# Patient Record
Sex: FEMALE | Race: WHITE | Employment: FULL TIME | URBAN - METROPOLITAN AREA
[De-identification: names, ages, dates, MRNs, and addresses within clinical notes are randomized per-mention and may not be internally consistent; named-entity substitution may affect disease eponyms.]

---

## 2020-06-08 LAB
A/G RATIO, EXTERNAL: NORMAL
ALBUMIN, EXTERNAL: NORMAL
ALK PHOS, EXTERNAL: NORMAL
ANION GAP BLD CALC-SCNC: NORMAL MMOL/L
BILI DIRECT, EXTERNAL: NORMAL
BILI TOTAL, EXTERNAL: NORMAL
BUN BLD-MCNC: NORMAL MG/DL
BUN/CREATININE RATIO, EXTERNAL: NORMAL
CALCIUM, EXTERNAL: NORMAL
CALCIUM, ION, EXTERNAL: NORMAL
CHLORIDE, EXTERNAL: NORMAL
CO2, EXTERNAL: NORMAL
CREATININE SER, EXTERNAL: NORMAL
EGFR IF AFA, EXTERNAL: NORMAL
EGFR NOT AFA, EXTERNAL: NORMAL
GLOBULIN, EXTERNAL: NORMAL
GLUCOSE SER, EXTERNAL: NORMAL
POTASSIUM, EXTERNAL: NORMAL
PROTEIN TOT, EXTERNAL: NORMAL
SGOT (AST), EXTERNAL: NORMAL
SGPT (ALT), EXTERNAL: NORMAL
SODIUM, EXTERNAL: NORMAL

## 2020-07-23 ENCOUNTER — TELEPHONE (OUTPATIENT)
Dept: FAMILY MEDICINE CLINIC | Age: 25
End: 2020-07-23

## 2020-07-23 DIAGNOSIS — R10.84 GENERALIZED ABDOMINAL PAIN: Primary | ICD-10-CM

## 2020-07-23 NOTE — TELEPHONE ENCOUNTER
Patient called and LVM on 07/23/2020 @ 10:39 requesting a call back. I returned her call. She was seen on Monday, July 20, 2020 at the ER for abdominal pain. She was given a CT that was normal.  She is not been able to eat. She is wanting to know if we can order a US for the gallbladder. 07/27/2020  Patient called back on 07/24/2020 @ 12:54 PM requesting an order for an US of the gallbladder.

## 2020-07-31 ENCOUNTER — TELEPHONE (OUTPATIENT)
Dept: FAMILY MEDICINE CLINIC | Age: 25
End: 2020-07-31

## 2020-07-31 NOTE — TELEPHONE ENCOUNTER
Portal message from [place]. [employer] link sent to pt. Good morning,   I went to the hospital this past Monday because I was having excruciating stomach pains. They did a CT and blood work but everything came back fine. Although the pain is not as bad as it was, I still feel like something is wrong. Through describing my pain to others, everyone seems to think it could be my gallbladder. I really do not Latonya Dejesus go back to the hospital and would rather see you asap to see what you think.  Please advise me what to do.     Thank you!

## 2020-08-03 ENCOUNTER — OP HISTORICAL/CONVERTED ENCOUNTER (OUTPATIENT)
Dept: OTHER | Age: 25
End: 2020-08-03

## 2020-08-06 DIAGNOSIS — R10.84 GENERALIZED ABDOMINAL PAIN: ICD-10-CM

## 2020-08-06 RX ORDER — LISINOPRIL 10 MG/1
10 TABLET ORAL DAILY
Qty: 90 TAB | Refills: 3 | Status: CANCELLED | OUTPATIENT
Start: 2020-08-06

## 2020-08-06 NOTE — TELEPHONE ENCOUNTER
Patient called requesting her US results from 08/03/2020. Patient is requesting a new prior auth for contrdaisy. [phone] is her return number.

## 2020-08-06 NOTE — TELEPHONE ENCOUNTER
1)Results are in the chart now. 2) PA for contrave was completed in covermymeds. com Key [id number]  3) patient will run out of lisinopril 10 mg before appointment.

## 2020-08-10 VITALS
SYSTOLIC BLOOD PRESSURE: 139 MMHG | HEART RATE: 64 BPM | RESPIRATION RATE: 18 BRPM | WEIGHT: 293 LBS | TEMPERATURE: 98.7 F | BODY MASS INDEX: 41.95 KG/M2 | HEIGHT: 70 IN | DIASTOLIC BLOOD PRESSURE: 93 MMHG | OXYGEN SATURATION: 98 %

## 2020-08-10 RX ORDER — NYSTATIN 100000 [USP'U]/G
POWDER TOPICAL 4 TIMES DAILY
COMMUNITY
End: 2020-11-04

## 2020-08-10 RX ORDER — NALTREXONE HYDROCHLORIDE AND BUPROPION HYDROCHLORIDE 8; 90 MG/1; MG/1
TABLET, EXTENDED RELEASE ORAL
COMMUNITY
End: 2020-08-11

## 2020-08-10 RX ORDER — LISINOPRIL 10 MG/1
TABLET ORAL DAILY
COMMUNITY
End: 2020-08-11 | Stop reason: SDUPTHER

## 2020-08-10 RX ORDER — ONDANSETRON HYDROCHLORIDE 8 MG/1
8 TABLET, FILM COATED ORAL
COMMUNITY
End: 2020-12-11 | Stop reason: SDUPTHER

## 2020-08-10 RX ORDER — IBUPROFEN 800 MG/1
TABLET ORAL
COMMUNITY
End: 2020-11-04

## 2020-08-10 RX ORDER — NORGESTIMATE AND ETHINYL ESTRADIOL 0.25-0.035
1 KIT ORAL DAILY
COMMUNITY
End: 2020-08-11 | Stop reason: SDUPTHER

## 2020-08-10 RX ORDER — MECLIZINE HYDROCHLORIDE 25 MG/1
TABLET ORAL
COMMUNITY
End: 2020-11-04

## 2020-08-10 RX ORDER — PROMETHAZINE HYDROCHLORIDE 12.5 MG/1
TABLET ORAL
COMMUNITY
End: 2020-11-04

## 2020-08-11 ENCOUNTER — OFFICE VISIT (OUTPATIENT)
Dept: FAMILY MEDICINE CLINIC | Age: 25
End: 2020-08-11
Payer: COMMERCIAL

## 2020-08-11 VITALS
TEMPERATURE: 98 F | WEIGHT: 293 LBS | BODY MASS INDEX: 43.4 KG/M2 | RESPIRATION RATE: 16 BRPM | SYSTOLIC BLOOD PRESSURE: 122 MMHG | HEART RATE: 72 BPM | OXYGEN SATURATION: 98 % | DIASTOLIC BLOOD PRESSURE: 68 MMHG | HEIGHT: 69 IN

## 2020-08-11 DIAGNOSIS — E66.01 OBESITY, MORBID (HCC): ICD-10-CM

## 2020-08-11 DIAGNOSIS — I10 BENIGN HYPERTENSION: ICD-10-CM

## 2020-08-11 DIAGNOSIS — N92.6 IRREGULAR PERIODS: ICD-10-CM

## 2020-08-11 DIAGNOSIS — E53.8 B12 DEFICIENCY: Primary | ICD-10-CM

## 2020-08-11 DIAGNOSIS — K21.9 GERD WITHOUT ESOPHAGITIS: ICD-10-CM

## 2020-08-11 PROCEDURE — 96372 THER/PROPH/DIAG INJ SC/IM: CPT

## 2020-08-11 PROCEDURE — 99214 OFFICE O/P EST MOD 30 MIN: CPT | Performed by: NURSE PRACTITIONER

## 2020-08-11 RX ORDER — NORGESTIMATE AND ETHINYL ESTRADIOL 0.25-0.035
1 KIT ORAL DAILY
Qty: 84 DOSE PACK | Refills: 3 | Status: SHIPPED | OUTPATIENT
Start: 2020-08-11 | End: 2020-12-11

## 2020-08-11 RX ORDER — LISINOPRIL 10 MG/1
10 TABLET ORAL DAILY
Qty: 90 TAB | Refills: 1 | Status: SHIPPED | OUTPATIENT
Start: 2020-08-11 | End: 2020-12-11 | Stop reason: SDUPTHER

## 2020-08-11 RX ORDER — PANTOPRAZOLE SODIUM 20 MG/1
20 TABLET, DELAYED RELEASE ORAL DAILY
Qty: 90 TAB | Refills: 1 | Status: SHIPPED | OUTPATIENT
Start: 2020-08-11 | End: 2020-11-04

## 2020-08-11 RX ORDER — CYANOCOBALAMIN 1000 UG/ML
1000 INJECTION, SOLUTION INTRAMUSCULAR; SUBCUTANEOUS EVERY 2 WEEKS
Status: SHIPPED | OUTPATIENT
Start: 2020-08-11

## 2020-08-11 RX ORDER — NALTREXONE HYDROCHLORIDE AND BUPROPION HYDROCHLORIDE 8; 90 MG/1; MG/1
TABLET, EXTENDED RELEASE ORAL
Qty: 120 TAB | Refills: 5 | Status: SHIPPED | OUTPATIENT
Start: 2020-08-11 | End: 2021-02-12

## 2020-08-11 RX ADMIN — CYANOCOBALAMIN 1000 MCG: 1000 INJECTION, SOLUTION INTRAMUSCULAR; SUBCUTANEOUS at 12:31

## 2020-08-11 NOTE — PATIENT INSTRUCTIONS
Supplements to complement your weight loss effort may include Premier Muscle milk Ensure Protein max Fax prior authorization form to [phone] 
 
hypertension well controlled, no significant medication side effects noted, For the abdominal pain, likely secondary to GERD, will start PPI to help better manage Will continue with contrave for weigh loss effort Will continue with Lisinopril for blood pressure control. Will start B12 injections for B12 deffciency and during weight loss management 
current treatment plan is effective, no change in therapy 
lab results and schedule of future lab studies reviewed with patient 
repeat labs ordered prior to next appointment.

## 2020-08-11 NOTE — PROGRESS NOTES
Patient presents to the office for following up for her blood pressure, discuss US results. She states the day she had the stomach pain and went to the ER it is the worst pain she has ever had.

## 2020-08-11 NOTE — PROGRESS NOTES
Subjective:     Dwight Trevino is a 26 y.o. female who presents for follow up of hypertension, obesity and abdominal pain. Diet and Lifestyle: generally follows a low fat low cholesterol diet, generally follows a low sodium diet, exercises regularly, nonsmoker  Home BP Monitoring: is not measured at home    Cardiovascular ROS: taking medications as instructed, no medication side effects noted, no TIA's, no chest pain on exertion, no dyspnea on exertion, no swelling of ankles. New concerns: Stopped fried food, sodas, fast food  Getting indegestion now. Patient Active Problem List   Diagnosis Code    Dizziness R42    [facility] ([facility] E66.01     Patient Active Problem List    Diagnosis Date Noted    Obesity, morbid ([facility] 08/11/2020    Dizziness      Current Outpatient Medications   Medication Sig Dispense Refill    lisinopriL (PRINIVIL, ZESTRIL) 10 mg tablet Take 1 Tab by mouth daily. Indications: high blood pressure 90 Tab 1    pantoprazole (PROTONIX) 20 mg tablet Take 1 Tab by mouth daily. Indications: gastroesophageal reflux disease 90 Tab 1    norgestimate-ethinyl estradioL (Mono-Linyah) 0.25-35 mg-mcg tab Take 1 Tab by mouth daily. Indications: absence of menstrual periods 84 Dose Pack 3    ibuprofen (MOTRIN) 800 mg tablet Take  by mouth.  meclizine (ANTIVERT) 25 mg tablet Take  by mouth three (3) times daily as needed for Dizziness.  nystatin (Nyamyc) powder Apply  to affected area four (4) times daily.  ondansetron hcl (ZOFRAN) 8 mg tablet Take 8 mg by mouth every eight (8) hours as needed for Nausea or Vomiting.  promethazine (PHENERGAN) 12.5 mg tablet Take  by mouth every six (6) hours as needed for Nausea.       Contrave 8-90 mg TbER ER tablet take 2 tablets by mouth twice a day for 30 DAYS 120 Tab 5     Current Facility-Administered Medications   Medication Dose Route Frequency Provider Last Rate Last Dose    cyanocobalamin (VITAMIN B12) injection 1,000 mcg 1,000 mcg IntraMUSCular EVERY 2 WEEKS Klaudia Martinez, LINDSAY   4,276 mcg at 08/11/20 1231     No Known Allergies  Past Medical History:   Diagnosis Date    Dizziness      History reviewed. No pertinent surgical history. Family History   Problem Relation Age of Onset    Hypertension Mother     Diabetes Father         type 2     Thyroid Disease Sister     Hypertension Maternal Grandmother     Cancer Maternal Grandmother     Diabetes Paternal Grandmother         type 2     Social History     Tobacco Use    Smoking status: Never Smoker    Smokeless tobacco: Never Used   Substance Use Topics    Alcohol use: Not Currently        No results found for: WBC, WBCT, WBCPOC, HGB, HGBPOC, HCT, HCTPOC, PLT, PLTPOC, MCV, MCVPOC, HGBEXT, HCTEXT, PLTEXT  No results found for: HBA1C, ECA0DXTG, HGBE8, GLU, GESTF, GLUCPOC, MCACR, MCA1, MCA2, MCA3, MCAU, LDL, LDLC, DLDLP, KELSY, CREAPOC, ACREA, CREA, REFC3, REFC4, [id number]   No results found for: CHOL, CHOLPOCT, HDL, LDL, LDLC, LDLCPOC, LDLCEXT, TRIGL, TGLPOCT, CHHD, CHHDX  No results found for: TSH, TSH2, TSH3, TSHP, TSHELE, TSHEXT, TT3, T3U, T3UP, FRT3, FT3, FT4, FT4P, T4, T4P, FT4T, TT7, TSHEXT      Review of Systems, additional:  A comprehensive review of systems was negative except for that written in the HPI. Objective:     Visit Vitals  /68 (BP 1 Location: Right arm, BP Patient Position: Sitting)   Pulse 72   Temp 98 °F (36.7 °C) (Oral)   Resp 16   Ht 5' 9\" (1.753 m)   Wt 296 lb (134.3 kg)   LMP 05/01/2020   SpO2 98%   BMI 43.71 kg/m²     Appearance: alert, well appearing, and in no distress, oriented to person, place, and time and overweight.   General exam: CVS exam BP noted to be well controlled today in office, S1, S2 normal, no gallop, no murmur, chest clear, no JVD, no HSM, no edema, diabetic exam heart sounds normal rate, regular rhythm, normal S1, S2, no murmurs, rubs, clicks or gallops, chest clear, peripheral vascular exam radial pulses normal, neurological exam alert, oriented, normal speech, no focal findings or movement disorder noted, neck supple without rigidity, funduscopic exam normal, discs flat and sharp, normal muscle tone, no tremors, strength 5/5. Lab review: labs are reviewed, up to date and normal.     Assessment/Plan:     hypertension well controlled, no significant medication side effects noted,   For the abdominal pain, likely secondary to GERD, will start PPI to help better manage  Will continue with contrave for weigh loss effort  Will continue with Lisinopril for blood pressure control. Will start B12 injections for B12 deffciency and during weight loss management  current treatment plan is effective, no change in therapy  lab results and schedule of future lab studies reviewed with patient  repeat labs ordered prior to next appointment. ICD-10-CM ICD-9-CM    1. B12 deficiency  E53.8 266.2 cyanocobalamin (VITAMIN B12) injection 1,000 mcg   2. Benign hypertension  I10 401.1 lisinopriL (PRINIVIL, ZESTRIL) 10 mg tablet   3. GERD without esophagitis  K21.9 530.81 pantoprazole (PROTONIX) 20 mg tablet   4. Irregular periods  N92.6 626.4 norgestimate-ethinyl estradioL (Mono-Linyah) 0.25-35 mg-mcg tab   5.  Obesity, morbid ([address]  E66.01 278.01

## 2020-08-13 PROBLEM — E53.8 B12 DEFICIENCY: Status: ACTIVE | Noted: 2020-08-13

## 2020-08-13 PROBLEM — N92.6 IRREGULAR PERIODS: Status: ACTIVE | Noted: 2020-08-13

## 2020-08-13 PROBLEM — K21.9 GERD WITHOUT ESOPHAGITIS: Status: ACTIVE | Noted: 2020-08-13

## 2020-08-13 PROBLEM — I10 BENIGN HYPERTENSION: Status: ACTIVE | Noted: 2020-08-13

## 2020-08-25 ENCOUNTER — OFFICE VISIT (OUTPATIENT)
Dept: FAMILY MEDICINE CLINIC | Age: 25
End: 2020-08-25

## 2020-08-25 VITALS
HEART RATE: 88 BPM | WEIGHT: 293 LBS | BODY MASS INDEX: 43.27 KG/M2 | OXYGEN SATURATION: 96 % | TEMPERATURE: 99.1 F | DIASTOLIC BLOOD PRESSURE: 79 MMHG | SYSTOLIC BLOOD PRESSURE: 126 MMHG | RESPIRATION RATE: 18 BRPM

## 2020-08-25 DIAGNOSIS — E53.8 VITAMIN B 12 DEFICIENCY: Primary | ICD-10-CM

## 2020-08-25 DIAGNOSIS — Z23 ENCOUNTER FOR IMMUNIZATION: ICD-10-CM

## 2020-09-04 RX ORDER — CYANOCOBALAMIN 1000 UG/ML
1000 INJECTION, SOLUTION INTRAMUSCULAR; SUBCUTANEOUS EVERY 2 WEEKS
Status: CANCELLED | OUTPATIENT
Start: 2020-09-04 | End: 2020-09-18

## 2020-09-11 ENCOUNTER — VIRTUAL VISIT (OUTPATIENT)
Dept: FAMILY MEDICINE CLINIC | Age: 25
End: 2020-09-11
Payer: COMMERCIAL

## 2020-09-11 DIAGNOSIS — N92.1 MENORRHAGIA WITH IRREGULAR CYCLE: ICD-10-CM

## 2020-09-11 DIAGNOSIS — N92.6 IRREGULAR PERIODS: Primary | ICD-10-CM

## 2020-09-11 PROCEDURE — 99214 OFFICE O/P EST MOD 30 MIN: CPT | Performed by: NURSE PRACTITIONER

## 2020-09-11 RX ORDER — NORGESTIMATE AND ETHINYL ESTRADIOL 7DAYSX3 LO
1 KIT ORAL DAILY
Qty: 3 DOSE PACK | Refills: 0 | Status: SHIPPED | OUTPATIENT
Start: 2020-09-11 | End: 2020-10-13 | Stop reason: SDUPTHER

## 2020-09-11 NOTE — PROGRESS NOTES
Subjective:     Pascual Griffiths is a 26 y.o. female who presents for follow up of hypertension, hyperlipidemia and irregular menstrual cycle. .    Diet and Lifestyle: generally follows a low fat low cholesterol diet, generally follows a low sodium diet, exercises regularly, nonsmoker  Home BP Monitoring: is well controlled at home, ranging 120's/70's    Cardiovascular ROS: taking medications as instructed, no medication side effects noted, no TIA's, no chest pain on exertion, no dyspnea on exertion, no swelling of ankles. New concerns: Been on her menstrual cycle since august 15, 2020 varies in intensity, having blood clots, on seasonique, nauseated, . Patient Active Problem List   Diagnosis Code    Dizziness R42    Obesity, morbid ([facility] E66.01    B12 deficiency E53.8    Benign hypertension I10    GERD without esophagitis K21.9    Irregular periods N92.6     Patient Active Problem List    Diagnosis Date Noted    B12 deficiency 08/13/2020    Benign hypertension 08/13/2020    GERD without esophagitis 08/13/2020    Irregular periods 08/13/2020    [facility]/11/[facility]    Dizziness      Current Outpatient Medications   Medication Sig Dispense Refill    norgestimate-ethinyl estradioL (ORTHO TRI-CYCLEN LO) 0.18/0.215/0.25 mg-25 mcg tab Take 1 Tab by mouth daily. 3 Dose Pack 0    lisinopriL (PRINIVIL, ZESTRIL) 10 mg tablet Take 1 Tab by mouth daily. Indications: high blood pressure 90 Tab 1    pantoprazole (PROTONIX) 20 mg tablet Take 1 Tab by mouth daily. Indications: gastroesophageal reflux disease 90 Tab 1    norgestimate-ethinyl estradioL (Mono-Linyah) 0.25-35 mg-mcg tab Take 1 Tab by mouth daily. Indications: absence of menstrual periods 84 Dose Pack 3    Contrave 8-90 mg TbER ER tablet take 2 tablets by mouth twice a day for 30 DAYS 120 Tab 5    ibuprofen (MOTRIN) 800 mg tablet Take  by mouth.       meclizine (ANTIVERT) 25 mg tablet Take  by mouth three (3) times daily as needed for Dizziness.  nystatin (Nyamyc) powder Apply  to affected area four (4) times daily.  ondansetron hcl (ZOFRAN) 8 mg tablet Take 8 mg by mouth every eight (8) hours as needed for Nausea or Vomiting.  promethazine (PHENERGAN) 12.5 mg tablet Take  by mouth every six (6) hours as needed for Nausea. Current Facility-Administered Medications   Medication Dose Route Frequency Provider Last Rate Last Dose    cyanocobalamin (VITAMIN B12) injection 1,000 mcg  1,000 mcg IntraMUSCular EVERY 2 WEEKS Klaudia Martinez NP   7,569 mcg at 08/11/20 1231     No Known Allergies  Past Medical History:   Diagnosis Date    Dizziness      History reviewed. No pertinent surgical history. Family History   Problem Relation Age of Onset    Hypertension Mother     Diabetes Father         type 2     Thyroid Disease Sister     Hypertension Maternal Grandmother     Cancer Maternal Grandmother     Diabetes Paternal Grandmother         type 2     Social History     Tobacco Use    Smoking status: Never Smoker    Smokeless tobacco: Never Used   Substance Use Topics    Alcohol use: Not Currently        No results found for: WBC, WBCT, WBCPOC, HGB, HGBPOC, HCT, HCTPOC, PLT, PLTPOC, MCV, MCVPOC, HGBEXT, HCTEXT, PLTEXT  No results found for: HBA1C, [id number], HGBE8, GLU, GESTF, GLUCPOC, MCACR, MCA1, MCA2, MCA3, MCAU, LDL, LDLC, DLDLP, KELSY, CREAPOC, ACREA, CREA, REFC3, REFC4, [id number]   No results found for: CHOL, CHOLPOCT, HDL, LDL, LDLC, LDLCPOC, LDLCEXT, TRIGL, TGLPOCT, CHHD, CHHDX  No results found for: TSH, TSH2, TSH3, TSHP, TSHELE, TSHEXT, TT3, T3U, T3UP, FRT3, FT3, FT4, FT4P, T4, T4P, FT4T, TT7, TSHEXT      Review of Systems, additional:  A comprehensive review of systems was negative except for that written in the HPI. Objective:     Visit Vitals  LMP 08/15/2020 (Exact Date)     Appearance: alert, well appearing, and in no distress, oriented to person, place, and time and overweight.   General exam: neurological exam alert, oriented, normal speech, no focal findings or movement disorder noted, screening mental status exam normal, neck supple without rigidity, motor and sensory grossly normal bilaterally. Lab review: labs are reviewed, up to date and normal.     Assessment/Plan:     Due to the change on her menstrual cycles we will do labs to further evaluate    We will also change her current hormonal therapy to see if this help correct her issues    Follow-up in 1 month for reevaluation or sooner as needed    ICD-10-CM ICD-9-CM    1. Irregular periods  N92.6 626.4 norgestimate-ethinyl estradioL (ORTHO TRI-CYCLEN LO) 0.18/0.215/0.25 mg-25 mcg tab   2.  Menorrhagia with irregular cycle  N92.1 626.2 BLOOD TYPE, (ABO+RH)      CBC WITH AUTOMATED DIFF      METABOLIC PANEL, COMPREHENSIVE      TSH 3RD GENERATION

## 2020-09-16 LAB
ABO GROUP BLD: NORMAL
ALBUMIN SERPL-MCNC: 4.5 G/DL (ref 3.9–5)
ALBUMIN/GLOB SERPL: 1.8 {RATIO} (ref 1.2–2.2)
ALP SERPL-CCNC: 81 IU/L (ref 39–117)
ALT SERPL-CCNC: 31 IU/L (ref 0–32)
AST SERPL-CCNC: 20 IU/L (ref 0–40)
BASOPHILS # BLD AUTO: 0 X10E3/UL (ref 0–0.2)
BASOPHILS NFR BLD AUTO: 0 %
BILIRUB SERPL-MCNC: 0.3 MG/DL (ref 0–1.2)
BUN SERPL-MCNC: 8 MG/DL (ref 6–20)
BUN/CREAT SERPL: 10 (ref 9–23)
CALCIUM SERPL-MCNC: 9.5 MG/DL (ref 8.7–10.2)
CHLORIDE SERPL-SCNC: 98 MMOL/L (ref 96–106)
CO2 SERPL-SCNC: 24 MMOL/L (ref 20–29)
CREAT SERPL-MCNC: 0.77 MG/DL (ref 0.57–1)
EOSINOPHIL # BLD AUTO: 0.2 X10E3/UL (ref 0–0.4)
EOSINOPHIL NFR BLD AUTO: 3 %
ERYTHROCYTE [DISTWIDTH] IN BLOOD BY AUTOMATED COUNT: 13.2 % (ref 11.7–15.4)
GLOBULIN SER CALC-MCNC: 2.5 G/DL (ref 1.5–4.5)
GLUCOSE SERPL-MCNC: 94 MG/DL (ref 65–99)
HCT VFR BLD AUTO: 42.7 % (ref 34–46.6)
HGB BLD-MCNC: 13.8 G/DL (ref 11.1–15.9)
IMM GRANULOCYTES # BLD AUTO: 0 X10E3/UL (ref 0–0.1)
IMM GRANULOCYTES NFR BLD AUTO: 0 %
LYMPHOCYTES # BLD AUTO: 1.4 X10E3/UL (ref 0.7–3.1)
LYMPHOCYTES NFR BLD AUTO: 23 %
MCH RBC QN AUTO: 26.8 PG (ref 26.6–33)
MCHC RBC AUTO-ENTMCNC: 32.3 G/DL (ref 31.5–35.7)
MCV RBC AUTO: 83 FL (ref 79–97)
MONOCYTES # BLD AUTO: 0.3 X10E3/UL (ref 0.1–0.9)
MONOCYTES NFR BLD AUTO: 5 %
NEUTROPHILS # BLD AUTO: 4.4 X10E3/UL (ref 1.4–7)
NEUTROPHILS NFR BLD AUTO: 69 %
PLATELET # BLD AUTO: 269 X10E3/UL (ref 150–450)
POTASSIUM SERPL-SCNC: 4.6 MMOL/L (ref 3.5–5.2)
PROT SERPL-MCNC: 7 G/DL (ref 6–8.5)
RBC # BLD AUTO: 5.14 X10E6/UL (ref 3.77–5.28)
RH BLD: POSITIVE
SODIUM SERPL-SCNC: 141 MMOL/L (ref 134–144)
TSH SERPL DL<=0.005 MIU/L-ACNC: 2.05 UIU/ML (ref 0.45–4.5)
WBC # BLD AUTO: 6.3 X10E3/UL (ref 3.4–10.8)

## 2020-10-07 NOTE — PROGRESS NOTES
I have reviewed the note dated 8- and agree that I was available for discussion in person, telephonically or virtually. I will follow along with LINDSAY Michael.     Mono Loyola, DO

## 2020-10-13 ENCOUNTER — OFFICE VISIT (OUTPATIENT)
Dept: FAMILY MEDICINE CLINIC | Age: 25
End: 2020-10-13
Payer: COMMERCIAL

## 2020-10-13 VITALS
RESPIRATION RATE: 18 BRPM | BODY MASS INDEX: 42.06 KG/M2 | OXYGEN SATURATION: 98 % | DIASTOLIC BLOOD PRESSURE: 85 MMHG | WEIGHT: 284 LBS | SYSTOLIC BLOOD PRESSURE: 123 MMHG | TEMPERATURE: 98.6 F | HEIGHT: 69 IN | HEART RATE: 85 BPM

## 2020-10-13 DIAGNOSIS — E53.8 B12 DEFICIENCY: Primary | ICD-10-CM

## 2020-10-13 DIAGNOSIS — N92.6 IRREGULAR PERIODS: ICD-10-CM

## 2020-10-13 DIAGNOSIS — K59.00 CONSTIPATION, UNSPECIFIED CONSTIPATION TYPE: ICD-10-CM

## 2020-10-13 DIAGNOSIS — E66.01 OBESITY, MORBID (HCC): ICD-10-CM

## 2020-10-13 DIAGNOSIS — I10 BENIGN HYPERTENSION: ICD-10-CM

## 2020-10-13 DIAGNOSIS — R16.1 SPLENOMEGALY: ICD-10-CM

## 2020-10-13 PROCEDURE — 99214 OFFICE O/P EST MOD 30 MIN: CPT | Performed by: NURSE PRACTITIONER

## 2020-10-13 RX ORDER — CYANOCOBALAMIN 1000 UG/ML
1000 INJECTION, SOLUTION INTRAMUSCULAR; SUBCUTANEOUS ONCE
Status: DISCONTINUED | OUTPATIENT
Start: 2020-10-13 | End: 2020-10-13

## 2020-10-13 NOTE — PATIENT INSTRUCTIONS
For the concerns of constipation we will refer to GI for further evaluation For the concerns of extensive irregular.'s we will refer to OB/GYN for further evaluation Recent labs have been reviewed within normal limits at this point we will continue with B12 injections and vitamin supplementation and wait after referrals to see if there is anything else that need to be addressed Follow-up in 2 months for reevaluation or sooner as needed

## 2020-10-13 NOTE — PROGRESS NOTES
Subjective:   José Miguel Fernandez is a 26 y.o. female who presents for follow up of hypertension and obesity. Diet and Lifestyle: generally follows a low fat low cholesterol diet, generally follows a low sodium diet, exercises regularly, nonsmoker   Home BP Monitoring: is not measured at home   Cardiovascular ROS: taking medications as instructed, no medication side effects noted, no TIA's, no chest pain on exertion, no dyspnea on exertion, no swelling of ankles. New concerns: Patient is following up for hypertension and weight loss. She has been on her cycle for 2 months with heavy bleeding. She is having nausea that is mostly in the morning and some days are worse than others (no chance of pregnancy)   She is having constipation issues which is new to her; she generally has loose stools. Resolved with lactulose x 2-3 weeks intermittently. She goes back to being constipated if she stops. Vaginal pain that is more external.   Fatigue - feels \"super drained\" even with B12 injections. Emotional as well. Patient Active Problem List   Diagnosis Code    Dizziness R42    Obesity, morbid ([facility] E66.01    B12 deficiency E53.8    Benign hypertension I10    GERD without esophagitis K21.9    Irregular periods N92.6          Patient Active Problem List    Diagnosis Date Noted    B12 deficiency 08/13/2020    Benign hypertension 08/13/2020    GERD without esophagitis 08/13/2020    Irregular periods 08/13/2020    [facility] 08/11/[facility]    Dizziness             Current Outpatient Medications   Medication Sig Dispense Refill    lisinopriL (PRINIVIL, ZESTRIL) 10 mg tablet Take 1 Tab by mouth daily. Indications: high blood pressure 90 Tab 1    pantoprazole (PROTONIX) 20 mg tablet Take 1 Tab by mouth daily. Indications: gastroesophageal reflux disease 90 Tab 1    norgestimate-ethinyl estradioL (Mono-Linyah) 0.25-35 mg-mcg tab Take 1 Tab by mouth daily.  Indications: absence of menstrual periods 84 Dose Pack 3    Contrave 8-90 mg TbER ER tablet take 2 tablets by mouth twice a day for 30 DAYS 120 Tab 5    ibuprofen (MOTRIN) 800 mg tablet Take by mouth.  meclizine (ANTIVERT) 25 mg tablet Take by mouth three (3) times daily as needed for Dizziness.  nystatin (Nyamyc) powder Apply to affected area four (4) times daily.  ondansetron hcl (ZOFRAN) 8 mg tablet Take 8 mg by mouth every eight (8) hours as needed for Nausea or Vomiting.  promethazine (PHENERGAN) 12.5 mg tablet Take by mouth every six (6) hours as needed for Nausea. Current Facility-Administered Medications   Medication Dose Route Frequency Provider Last Rate Last Dose    cyanocobalamin (VITAMIN B12) injection 1,000 mcg 1,000 mcg IntraMUSCular EVERY 2 WEEKS Klaudia Martinez NP  0,798 mcg at 08/11/20 1231   No Known Allergies        Past Medical History:   Diagnosis Date    Dizziness    History reviewed. No pertinent surgical history.          Family History   Problem Relation Age of Onset    Hypertension Mother     Diabetes Father     type 2     Thyroid Disease Sister     Hypertension Maternal Grandmother     Cancer Maternal Grandmother     Diabetes Paternal Grandmother     type 2     Social History          Tobacco Use    Smoking status: Never Smoker    Smokeless tobacco: Never Used   Substance Use Topics    Alcohol use: Not Currently           Lab Results   Component Value Date/Time    WBC 6.3 09/15/2020 08:02 AM    HGB 13.8 09/15/2020 08:02 AM    HCT 42.7 09/15/2020 08:02 AM    PLATELET 936 83/92/5194 08:02 AM    MCV 83 09/15/2020 08:02 AM           Lab Results   Component Value Date/Time    Glucose 94 09/15/2020 08:02 AM    Creatinine 0.77 09/15/2020 08:02 AM   No results found for: CHOL, CHOLPOCT, HDL, LDL, LDLC, LDLCPOC, LDLCEXT, TRIGL, TGLPOCT, CHHD, CHHDX         Lab Results   Component Value Date/Time    TSH 2.050 09/15/2020 08:02 AM   Review of Systems, additional:   A comprehensive review of systems was negative except for that written in the HPI. Objective:   Visit Vitals   /85 (BP 1 Location: Left arm, BP Patient Position: Sitting)   Pulse 85   Temp 98.6 °F (37 °C) (Oral)   Resp 18   Ht 5' 9\" (1.753 m)   Wt 284 lb (128.8 kg)   LMP 08/15/2020   SpO2 98%   BMI 41.94 kg/m²   Appearance: alert, well appearing, and in no distress, oriented to person, place, and time, overweight and acyanotic, in no respiratory distress. General exam: CVS exam BP noted to be well controlled today in office, S1, S2 normal, no gallop, no murmur, chest clear, no JVD, no HSM, no edema, peripheral vascular exam radial pulses normal, color, temperature, sensation in feet normal, no lesions, neurological exam alert, oriented, normal speech, no focal findings or movement disorder noted, screening mental status exam normal, neck supple without rigidity, motor and sensory grossly normal bilaterally, normal muscle tone, no tremors, strength 5/5. Lab review: labs are reviewed, up to date and normal.   Assessment/Plan: For the concerns of constipation we will refer to GI for further evaluation   For the concerns of extensive irregular.'s we will refer to OB/GYN for further evaluation   Recent labs have been reviewed within normal limits at this point we will continue with B12 injections and vitamin supplementation and wait after referrals to see if there is anything else that need to be addressed   Follow-up in 2 months for reevaluation or sooner as needed     ICD-10-CM ICD-9-CM    1. B12 deficiency  E53.8 266.2 cyanocobalamin (VITAMIN B12) injection 1,000 mcg      cyanocobalamin (VITAMIN B12) injection 1,000 mcg   2. Irregular periods  N92.6 626.4 REFERRAL TO OBSTETRICS AND GYNECOLOGY   3. Obesity, morbid ([address]  E66.01 278.01    4. Benign hypertension  I10 401.1    5. Constipation, unspecified constipation type  K59.00 564.00 CT ABD WO CONT      REFERRAL TO GASTROENTEROLOGY      CT ABD W WO CONT   6.  Splenomegaly  R16.1 789.2 CT ABD W WO CONT

## 2020-10-13 NOTE — PROGRESS NOTES
Subjective:   Radha Bishop is a 26 y.o. female who presents for follow up of hypertension and obesity. Diet and Lifestyle: generally follows a low fat low cholesterol diet, generally follows a low sodium diet, exercises regularly, nonsmoker   Home BP Monitoring: is not measured at home   Cardiovascular ROS: taking medications as instructed, no medication side effects noted, no TIA's, no chest pain on exertion, no dyspnea on exertion, no swelling of ankles. New concerns: Patient is following up for hypertension and weight loss. She has been on her cycle for 2 months with heavy bleeding. She is having nausea that is mostly in the morning and some days are worse than others (no chance of pregnancy)   She is having constipation issues which is new to her; she generally has loose stools. Resolved with lactulose x 2-3 weeks intermittently. She goes back to being constipated if she stops. Vaginal pain that is more external.   Fatigue - feels \"super drained\" even with B12 injections. Emotional as well. Patient Active Problem List   Diagnosis Code    Dizziness R42    Obesity, morbid ([facility] [address] E66.01    B12 deficiency E53.8    Benign hypertension I10    GERD without esophagitis K21.9    Irregular periods N92.6          Patient Active Problem List    Diagnosis Date Noted    B12 deficiency 08/13/2020    Benign hypertension 08/13/2020    GERD without esophagitis 08/13/2020    Irregular periods 08/13/2020    Obesity, morbid ([facility] 08/11/2020    Dizziness             Current Outpatient Medications   Medication Sig Dispense Refill    lisinopriL (PRINIVIL, ZESTRIL) 10 mg tablet Take 1 Tab by mouth daily. Indications: high blood pressure 90 Tab 1    pantoprazole (PROTONIX) 20 mg tablet Take 1 Tab by mouth daily. Indications: gastroesophageal reflux disease 90 Tab 1    norgestimate-ethinyl estradioL (Mono-Linyah) 0.25-35 mg-mcg tab Take 1 Tab by mouth daily.  Indications: absence of menstrual periods 84 Dose Pack 3    Contrave 8-90 mg TbER ER tablet take 2 tablets by mouth twice a day for 30 DAYS 120 Tab 5    ibuprofen (MOTRIN) 800 mg tablet Take by mouth.  meclizine (ANTIVERT) 25 mg tablet Take by mouth three (3) times daily as needed for Dizziness.  nystatin (Nyamyc) powder Apply to affected area four (4) times daily.  ondansetron hcl (ZOFRAN) 8 mg tablet Take 8 mg by mouth every eight (8) hours as needed for Nausea or Vomiting.  promethazine (PHENERGAN) 12.5 mg tablet Take by mouth every six (6) hours as needed for Nausea. Current Facility-Administered Medications   Medication Dose Route Frequency Provider Last Rate Last Dose    cyanocobalamin (VITAMIN B12) injection 1,000 mcg 1,000 mcg IntraMUSCular EVERY 2 WEEKS Klaudia Martinez NP  7,565 mcg at 08/11/20 1231   No Known Allergies        Past Medical History:   Diagnosis Date    Dizziness    History reviewed. No pertinent surgical history.          Family History   Problem Relation Age of Onset    Hypertension Mother     Diabetes Father     type 2     Thyroid Disease Sister     Hypertension Maternal Grandmother     Cancer Maternal Grandmother     Diabetes Paternal Grandmother     type 2     Social History          Tobacco Use    Smoking status: Never Smoker    Smokeless tobacco: Never Used   Substance Use Topics    Alcohol use: Not Currently           Lab Results   Component Value Date/Time    WBC 6.3 09/15/2020 08:02 AM    HGB 13.8 09/15/2020 08:02 AM    HCT 42.7 09/15/2020 08:02 AM    PLATELET 511 07/42/2990 08:02 AM    MCV 83 09/15/2020 08:02 AM           Lab Results   Component Value Date/Time    Glucose 94 09/15/2020 08:02 AM    Creatinine 0.77 09/15/2020 08:02 AM   No results found for: CHOL, CHOLPOCT, HDL, LDL, LDLC, LDLCPOC, LDLCEXT, TRIGL, TGLPOCT, CHHD, CHHDX         Lab Results   Component Value Date/Time    TSH 2.050 09/15/2020 08:02 AM   Review of Systems, additional:   A comprehensive review of systems was negative except for that written in the HPI. Objective:   Visit Vitals   /85 (BP 1 Location: Left arm, BP Patient Position: Sitting)   Pulse 85   Temp 98.6 °F (37 °C) (Oral)   Resp 18   Ht 5' 9\" (1.753 m)   Wt 284 lb (128.8 kg)   LMP 08/15/2020   SpO2 98%   BMI 41.94 kg/m²   Appearance: alert, well appearing, and in no distress, oriented to person, place, and time, overweight and acyanotic, in no respiratory distress. General exam: CVS exam BP noted to be well controlled today in office, S1, S2 normal, no gallop, no murmur, chest clear, no JVD, no HSM, no edema, peripheral vascular exam radial pulses normal, color, temperature, sensation in feet normal, no lesions, neurological exam alert, oriented, normal speech, no focal findings or movement disorder noted, screening mental status exam normal, neck supple without rigidity, motor and sensory grossly normal bilaterally, normal muscle tone, no tremors, strength 5/5. Lab review: labs are reviewed, up to date and normal.   Assessment/Plan: For the concerns of constipation we will refer to GI for further evaluation   For the concerns of extensive irregular.'s we will refer to OB/GYN for further evaluation   Recent labs have been reviewed within normal limits at this point we will continue with B12 injections and vitamin supplementation and wait after referrals to see if there is anything else that need to be addressed   Follow-up in 2 months for reevaluation or sooner as needed     ICD-10-CM ICD-9-CM    1. B12 deficiency  E53.8 266.2 cyanocobalamin (VITAMIN B12) injection 1,000 mcg      cyanocobalamin (VITAMIN B12) injection 1,000 mcg   2. Irregular periods  N92.6 626.4 REFERRAL TO OBSTETRICS AND GYNECOLOGY   3. Obesity, morbid ([address]  E66.01 278.01    4. Benign hypertension  I10 401.1    5. Constipation, unspecified constipation type  K59.00 564.00 CT ABD WO CONT      REFERRAL TO GASTROENTEROLOGY      CT ABD W WO CONT   6.  Splenomegaly  R16.1 789.2 CT ABD W WO CONT

## 2020-10-13 NOTE — PROGRESS NOTES
Patient is following up for hypertension and weight loss. She has been on her cycle for 2 months with heavy bleeding. She is having nausea that is mostly in the morning and some days are worse than others (no chance of pregnancy)  She is having constipation issues which is new to her; she generally has loose stools. Resolved with lactulose x 2-3 weeks intermittently. She goes back to being constipated if she stops. Vaginal pain that is more external.    Fatigue - feels \"super drained\" even with B12 injections. Emotional as well.      Cyanocobalamin 1000 mcg/ml  Sirena Brothers   [phone] [id number]  Lot [id number]  Exp 07/30/2021  Administered in Left Deltoid via IM injection

## 2020-10-14 RX ORDER — CYANOCOBALAMIN 1000 UG/ML
1000 INJECTION, SOLUTION INTRAMUSCULAR; SUBCUTANEOUS ONCE
Status: DISCONTINUED | OUTPATIENT
Start: 2020-10-14 | End: 2020-10-15

## 2020-11-04 ENCOUNTER — OFFICE VISIT (OUTPATIENT)
Dept: OBGYN CLINIC | Age: 25
End: 2020-11-04
Payer: COMMERCIAL

## 2020-11-04 VITALS
WEIGHT: 281 LBS | BODY MASS INDEX: 41.62 KG/M2 | HEIGHT: 69 IN | SYSTOLIC BLOOD PRESSURE: 114 MMHG | DIASTOLIC BLOOD PRESSURE: 78 MMHG

## 2020-11-04 DIAGNOSIS — N92.1 MENORRHAGIA WITH IRREGULAR CYCLE: ICD-10-CM

## 2020-11-04 DIAGNOSIS — N92.6 PROLONGED PERIODS: ICD-10-CM

## 2020-11-04 DIAGNOSIS — N92.6 IRREGULAR MENSES: Primary | ICD-10-CM

## 2020-11-04 PROCEDURE — 99214 OFFICE O/P EST MOD 30 MIN: CPT | Performed by: OBSTETRICS & GYNECOLOGY

## 2020-11-04 NOTE — PROGRESS NOTES
Yifan Carter is a 26 y.o. female, G0, lmp 8/15/2020 and ongoing, who presents today for the following:  Chief Complaint   Patient presents with    Vaginal Bleeding     Patient started a period 8/15/2020 and has been with vaginal bleeding and cramping since.  Pelvic Pain        Patient with menses ongoing since 8/15/2020. She was on Mono-Linyah when we prescribed the COCPs. She was meant to be on extended cycling but initially states she was not allowing for withdrawal bleeds. Upon further questioning, she states she was allowing for withdrawal bleeds. She states the COCP was changed by PCP to a \"higher dosing COCP\". But records still show mono-linyah. She was told to change to monthly dosing where she would take the placebo pills. She states she has heavy flow to vaginal spotting. She uses 4-5 pads/day. She changes prior to soaking through. She does notice some blood clots- largest is golf ball sized. Today is the first day without vaginal spotting. +Dysmenorrhea when menses is brown colored. CT a&p 7/20/2020 showed no acute findings in the abdomen or pelvis; uterus reported as anteflexed. Not sexually active- virginal. She is on contrave for weight loss- she has lost 33 pounds since August.        Review of Systems   Constitutional: Negative for chills and fever. Respiratory: Negative for shortness of breath. Cardiovascular: Negative for chest pain. Gastrointestinal: Positive for constipation. Negative for abdominal pain, diarrhea, nausea and vomiting. Current Outpatient Medications   Medication Sig    norelgestromin-ethinyl estradiol (ORTHO EVRA) 150-35 mcg/24 hr 1 Patch by TransDERmal route every seven (7) days. On the fourth week, do not wear a patch to allow for menses.  lisinopriL (PRINIVIL, ZESTRIL) 10 mg tablet Take 1 Tab by mouth daily. Indications: high blood pressure    norgestimate-ethinyl estradioL (Mono-Linyah) 0.25-35 mg-mcg tab Take 1 Tab by mouth daily.  Indications: absence of menstrual periods    Contrave 8-90 mg TbER ER tablet take 2 tablets by mouth twice a day for 30 DAYS    ondansetron hcl (ZOFRAN) 8 mg tablet Take 8 mg by mouth every eight (8) hours as needed for Nausea or Vomiting. Current Facility-Administered Medications   Medication    cyanocobalamin (VITAMIN B12) injection 1,000 mcg       Past Medical History:   Diagnosis Date    Dizziness     Obesity            /78 (BP 1 Location: Left arm, BP Patient Position: Sitting)   Ht 5' 9\" (1.753 m)   Wt 281 lb (127.5 kg)   LMP 08/15/2020 (Approximate)   BMI 41.50 kg/m²    Physical Exam  Constitutional:       Appearance: Normal appearance. Genitourinary:      Genitourinary Comments: Vulva:deferred     HENT:      Head: Normocephalic and atraumatic. Eyes:      Extraocular Movements: Extraocular movements intact. Neck:      Musculoskeletal: Normal range of motion. Pulmonary:      Effort: Pulmonary effort is normal.   Neurological:      Mental Status: She is alert and oriented to person, place, and time. Psychiatric:         Mood and Affect: Mood normal.         Behavior: Behavior normal.   Vitals signs reviewed. Assessment/plan:     ICD-10-CM ICD-9-CM    1. Irregular menses  N92.6 626.4 US PELV NON OBS      norelgestromin-ethinyl estradiol (ORTHO EVRA) 150-35 mcg/24 hr   2. Prolonged periods  N92.6 626.8 US PELV NON OBS      norelgestromin-ethinyl estradiol (ORTHO EVRA) 150-35 mcg/24 hr   3. Menorrhagia with irregular cycle  N92.1 626.2 US PELV NON OBS      norelgestromin-ethinyl estradiol (ORTHO EVRA) 150-35 mcg/24 hr     Discussed the irregular prolonged, heavy menses might be attributable to weight loss in setting of obesity, improper use of COCPs (if she was not having withdrawal bleeds every 3-4 months), vs anatomic pathology. TSH was wnl per chart review. Pelvic us ordered. Discussed management options such as changing COCP, patch, nuva ring, IUD. Pt wants to try the patch. RTC in 3 months.

## 2020-11-04 NOTE — PATIENT INSTRUCTIONS
Intrauterine Device (IUD) for Birth Control: Care Instructions Your Care Instructions The intrauterine device (IUD) is used to prevent pregnancy. It's a small, plastic, T-shaped device. Your doctor places the IUD in your uterus. If you and your doctor discuss it before you give birth, this can be done right after you have your baby. You are using either a hormonal IUD or a copper IUD. · Hormonal IUDs prevent pregnancy for 3 to 6 years, depending on which IUD is used. But your doctor may talk to you about leaving it in for longer. Once you have it, you don't have to do anything else to prevent pregnancy. · The copper IUD prevents pregnancy for 10 years. But your doctor may talk to you about leaving it in for longer. Once you have it, you don't have to do anything to prevent pregnancy. The IUD usually stays in the uterus until your doctor removes it. Follow-up care is a key part of your treatment and safety. Be sure to make and go to all appointments, and call your doctor if you are having problems. It's also a good idea to know your test results and keep a list of the medicines you take. How can you care for yourself at home? How do you use the IUD? · Your doctor inserts the IUD. This takes only a few minutes and can be done at your doctor's office. If you and your doctor discuss it before you give birth, the IUD can also be inserted right after you have your baby. · Your doctor may have you feel for the IUD string right after insertion, to be sure you know what it feels like. · If you want to check for the string on your own: ? Insert a finger into your vagina and feel for the cervix, which is at the top of the vagina and feels harder than the rest of your vagina (some women say it feels like the tip of your nose). ? You should be able to feel the thin, plastic string coming out of the opening of your cervix.  If you cannot feel the string, it doesn't always mean that the IUD is out of place. Sometimes the string is just difficult to feel or has been pulled up into the cervical canal (which will not harm you). · Your doctor may want to see you 4 to 6 weeks after the IUD insertion, to make sure it is in place. What if you think the IUD is not in place? Always read the label for specific instructions. Here are some basic guidelines: 
· Call your doctor and use backup birth control, such as a condom, or don't have intercourse until you know the IUD is working. · If you had intercourse, you can use emergency contraception to help prevent pregnancy. The most effective emergency contraception is prescribed by a doctor. This includes a prescription pill. If your IUD is no longer in place, a doctor may be able to insert a copper IUD as emergency contraception. You can also get emergency contraceptive pills without a prescription at most drugstores. What else do you need to know? · It's safe to use while breastfeeding. · The IUD can have side effects. ? The hormonal IUD usually reduces menstrual flow and cramping over time. It can also cause spotting, mood swings, and breast tenderness. ? The copper IUD can cause longer and heavier periods. · After an IUD is first put in, you may have some mild cramping and light spotting for 1 to 2 days. · The IUD doesn't protect against sexually transmitted infections (STIs), such as herpes or HIV/AIDS. If you're not sure whether your sex partner might have an STI, use a condom to protect against disease. When should you call for help? Call your doctor now or seek immediate medical care if: 
  · You have pain in your belly or pelvis.  
  · You have severe vaginal bleeding. This means that you are soaking through your usual pads or tampons each hour for 2 or more hours.  
  · You have vaginal discharge that smells bad.  
  · You have a fever.   
Watch closely for changes in your health, and be sure to contact your doctor if you have any problems. Where can you learn more? Go to http://www.gray.com/ Enter F159 in the search box to learn more about \"Intrauterine Device (IUD) for Birth Control: Care Instructions. \" Current as of: February 11, 2020               Content Version: 12.6 © 0351-3460 [employer]. Care instructions adapted under license by [employer] (which disclaims liability or warranty for this information). If you have questions about a medical condition or this instruction, always ask your healthcare professional. [address] any warranty or liability for your use of this information. Patch for Birth Control: Care Instructions Your Care Instructions The patch is used to prevent pregnancy. It looks like a bandage. It gives you a regular dose of the hormones estrogen and progestin. The patch comes in packs of three. You change the patch once a week for 3 weeks and then go without a patch for 1 week. During this week, you have your period. One pack provides birth control for 1 month. Follow-up care is a key part of your treatment and safety. Be sure to make and go to all appointments, and call your doctor if you are having problems. It's also a good idea to know your test results and keep a list of the medicines you take. How can you care for yourself at home? How do you use the patch? · Talk to your doctor about the best day to start using the patch. Many women start using the patch on the first day of their period. Ask if you will need to use backup birth control, such as a condom, or avoid intercourse for a period of time. · Always follow the directions that came with the patch. In general: ? Use the patch 3 out of 4 weeks. Put on a new patch every week on the same day of the week. The 4th week, don't wear a patch. You'll have your period. ? Put the patch on your lower belly, buttocks, or upper body.  Don't put in on your breasts. ? Don't put lotions, oils, powders, or makeup on the area you're going to put the patch. They could keep the patch from sticking. ? Try not to place the patch under bra straps. What if you forget a patch or it falls off? Always read the label for specific instructions, or call your doctor. Here are some basic guidelines: · In the first week, if you forget a patch or are late, put on a patch right away and: ? Use backup birth control, such as a condom, or don't have intercourse for 7 days. ? If you had intercourse, you can use emergency contraception as a way to prevent pregnancy. The most effective emergency contraception is the copper IUD (inserted by a doctor). You can also get emergency contraceptive pills without a prescription [facility]. · In the second and third weeks: 
? If you are 1 to 2 days late, put on a new patch right away. You don't need backup birth control or emergency contraception. ? If you are 3 or more days late, put on a new patch right away. Use backup birth control or don't have intercourse for 7 days. If you had intercourse, you can use emergency contraception. · If a patch doesn't stick well or falls off: ? For less than 24 hours, put it back on. If it doesn't stick well, use a new patch. ? For more than 24 hours, put on a new patch right away. Use backup birth control or don't have intercourse for 7 days. If you had intercourse, ask your doctor about using emergency contraception. What else do you need to know? · The patch can have side effects. ? You may have very light or skipped periods. ? You may have bleeding between periods (spotting). This usually decreases after 3 to 4 months. ? You may have mood changes, less interest in sex, or weight gain. · Avoid exposing the patch to heat. This includes direct sunlight or using tanning beds, heating pads, electric blankets, hot tubs, or saunas.  Direct sunlight or high heat changes how the patch releases hormones. This makes the chance of getting pregnant more likely. · To help remember to put a new patch on: ? Ava on a calendar the days you need to change the patch. ? Set up a reminder using your computer or other similar device. · Check with your doctor before you use any other medicines, including over-the-counter medicines, vitamins, herbal products, and supplements. Birth control hormones may not work as well to prevent pregnancy when combined with other medicines. · The patch doesn't protect against sexually transmitted infections (STIs), such as herpes or HIV/AIDS. If you're not sure whether your sex partner might have an STI, use a condom to protect against disease. When should you call for help? Watch closely for changes in your health, and be sure to contact your doctor if you have any problems. Where can you learn more? Go to http://www.EasyRun/ Enter 681 036 515 in the search box to learn more about \"Patch for Birth Control: Care Instructions. \" Current as of: February 11, 2020               Content Version: 12.6 © 7821-3508 [employer]. Care instructions adapted under license by [employer] (which disclaims liability or warranty for this information). If you have questions about a medical condition or this instruction, always ask your healthcare professional. [address] any warranty or liability for your use of this information.

## 2020-11-07 PROBLEM — N92.6 PROLONGED PERIODS: Status: ACTIVE | Noted: 2020-11-07

## 2020-11-07 PROBLEM — N92.1 MENORRHAGIA WITH IRREGULAR CYCLE: Status: ACTIVE | Noted: 2020-11-07

## 2020-11-13 ENCOUNTER — CLINICAL SUPPORT (OUTPATIENT)
Dept: FAMILY MEDICINE CLINIC | Age: 25
End: 2020-11-13
Payer: COMMERCIAL

## 2020-11-13 VITALS
OXYGEN SATURATION: 98 % | SYSTOLIC BLOOD PRESSURE: 123 MMHG | RESPIRATION RATE: 16 BRPM | DIASTOLIC BLOOD PRESSURE: 71 MMHG | WEIGHT: 277 LBS | TEMPERATURE: 97.6 F | BODY MASS INDEX: 40.91 KG/M2 | HEART RATE: 90 BPM

## 2020-11-13 DIAGNOSIS — E53.8 B12 DEFICIENCY: Primary | ICD-10-CM

## 2020-11-13 PROCEDURE — 96372 THER/PROPH/DIAG INJ SC/IM: CPT | Performed by: NURSE PRACTITIONER

## 2020-11-13 RX ORDER — CYANOCOBALAMIN 1000 UG/ML
1000 INJECTION, SOLUTION INTRAMUSCULAR; SUBCUTANEOUS EVERY 2 WEEKS
Status: SHIPPED | OUTPATIENT
Start: 2020-11-13

## 2020-11-13 RX ADMIN — CYANOCOBALAMIN 1000 MCG: 1000 INJECTION, SOLUTION INTRAMUSCULAR; SUBCUTANEOUS at 09:35

## 2020-12-10 ENCOUNTER — TRANSCRIBE ORDER (OUTPATIENT)
Dept: SCHEDULING | Age: 25
End: 2020-12-10

## 2020-12-10 DIAGNOSIS — N92.1 METRORRHAGIA: ICD-10-CM

## 2020-12-10 DIAGNOSIS — N92.6 IRREGULAR MENSTRUAL CYCLE: Primary | ICD-10-CM

## 2020-12-11 ENCOUNTER — OFFICE VISIT (OUTPATIENT)
Dept: FAMILY MEDICINE CLINIC | Age: 25
End: 2020-12-11
Payer: COMMERCIAL

## 2020-12-11 VITALS
HEIGHT: 69 IN | SYSTOLIC BLOOD PRESSURE: 120 MMHG | HEART RATE: 83 BPM | DIASTOLIC BLOOD PRESSURE: 66 MMHG | OXYGEN SATURATION: 98 % | TEMPERATURE: 98.6 F | WEIGHT: 276 LBS | RESPIRATION RATE: 16 BRPM | BODY MASS INDEX: 40.88 KG/M2

## 2020-12-11 DIAGNOSIS — E78.5 HYPERLIPIDEMIA, UNSPECIFIED HYPERLIPIDEMIA TYPE: ICD-10-CM

## 2020-12-11 DIAGNOSIS — I10 BENIGN HYPERTENSION: Primary | ICD-10-CM

## 2020-12-11 DIAGNOSIS — E66.01 OBESITY, MORBID (HCC): ICD-10-CM

## 2020-12-11 DIAGNOSIS — K21.9 GERD WITHOUT ESOPHAGITIS: ICD-10-CM

## 2020-12-11 DIAGNOSIS — E53.8 B12 DEFICIENCY: ICD-10-CM

## 2020-12-11 DIAGNOSIS — N92.6 IRREGULAR MENSES: ICD-10-CM

## 2020-12-11 PROCEDURE — 99214 OFFICE O/P EST MOD 30 MIN: CPT | Performed by: NURSE PRACTITIONER

## 2020-12-11 RX ORDER — LISINOPRIL 10 MG/1
10 TABLET ORAL DAILY
Qty: 90 TAB | Refills: 1 | Status: SHIPPED | OUTPATIENT
Start: 2020-12-11 | End: 2021-03-05 | Stop reason: ALTCHOICE

## 2020-12-11 RX ORDER — PANTOPRAZOLE SODIUM 20 MG/1
20 TABLET, DELAYED RELEASE ORAL DAILY
COMMUNITY
End: 2020-12-11 | Stop reason: SDUPTHER

## 2020-12-11 RX ORDER — PANTOPRAZOLE SODIUM 20 MG/1
20 TABLET, DELAYED RELEASE ORAL DAILY
Qty: 90 TAB | Refills: 1 | Status: SHIPPED | OUTPATIENT
Start: 2020-12-11 | End: 2021-03-05 | Stop reason: SDUPTHER

## 2020-12-11 RX ORDER — ONDANSETRON HYDROCHLORIDE 8 MG/1
8 TABLET, FILM COATED ORAL
Qty: 21 TAB | Refills: 5 | Status: SHIPPED | OUTPATIENT
Start: 2020-12-11 | End: 2021-03-05 | Stop reason: SDUPTHER

## 2020-12-11 RX ADMIN — CYANOCOBALAMIN 1000 MCG: 1000 INJECTION, SOLUTION INTRAMUSCULAR; SUBCUTANEOUS at 14:20

## 2020-12-18 NOTE — PROGRESS NOTES
Subjective:     Yelena Alvarez is a 26 y.o. female who presents for follow up of hypertension, hyperlipidemia, obesity and GERD. Diet and Lifestyle: generally follows a low fat low cholesterol diet, generally follows a low sodium diet, exercises regularly, nonsmoker  Home BP Monitoring: is not measured at home    Cardiovascular ROS: taking medications as instructed, no medication side effects noted, no TIA's, no chest pain on exertion, no dyspnea on exertion, no swelling of ankles. New concerns: Has follow a sensible diet and exercise regimen that has helped her lose weight and feels great. Patient Active Problem List   Diagnosis Code    Dizziness R42    Obesity, morbid ([facility] E66.01    B12 deficiency E53.8    Benign hypertension I10    GERD without esophagitis K21.9    Irregular menses N92.6    Prolonged periods N92.6    Menorrhagia with irregular cycle N92.1     Patient Active Problem List    Diagnosis Date Noted    Prolonged periods 11/07/2020    Menorrhagia with irregular cycle 11/07/2020    B12 deficiency 08/13/2020    Benign hypertension 08/13/2020    GERD without esophagitis 08/13/2020    Irregular menses 08/13/2020    Obesity, morbid ([facility] 08/11/2020    Dizziness      Current Outpatient Medications   Medication Sig Dispense Refill    pantoprazole (PROTONIX) 20 mg tablet Take 1 Tab by mouth daily. 90 Tab 1    lisinopriL (PRINIVIL, ZESTRIL) 10 mg tablet Take 1 Tab by mouth daily. Indications: high blood pressure 90 Tab 1    ondansetron hcl (ZOFRAN) 8 mg tablet Take 1 Tab by mouth every eight (8) hours as needed for Nausea or Nausea or Vomiting.  21 Tab 5    Contrave 8-90 mg TbER ER tablet take 2 tablets by mouth twice a day for 30 DAYS 120 Tab 5     Current Facility-Administered Medications   Medication Dose Route Frequency Provider Last Rate Last Admin    cyanocobalamin (VITAMIN B12) injection 1,000 mcg  1,000 mcg IntraMUSCular EVERY 2 WEEKS Klaudia Martinez NP   0,741 mcg at 12/11/20 1420    cyanocobalamin (VITAMIN B12) injection 1,000 mcg  1,000 mcg IntraMUSCular EVERY 2 WEEKS Klaudia Martinez NP   0,207 mcg at 08/11/20 1231     No Known Allergies  Past Medical History:   Diagnosis Date    Dizziness     Obesity      History reviewed. No pertinent surgical history. Family History   Problem Relation Age of Onset    Hypertension Mother     Diabetes Father         type 2     Thyroid Disease Sister     Hypertension Maternal Grandmother     Cancer Maternal Grandmother     Diabetes Paternal Grandmother         type 2     Social History     Tobacco Use    Smoking status: Never Smoker    Smokeless tobacco: Never Used   Substance Use Topics    Alcohol use: Not Currently        Lab Results   Component Value Date/Time    WBC 6.3 09/15/2020 08:02 AM    HGB 13.8 09/15/2020 08:02 AM    HCT 42.7 09/15/2020 08:02 AM    PLATELET 230 98/34/3452 08:02 AM    MCV 83 09/15/2020 08:02 AM     Lab Results   Component Value Date/Time    Glucose 94 09/15/2020 08:02 AM    Creatinine 0.77 09/15/2020 08:02 AM      No results found for: CHOL, CHOLPOCT, HDL, LDL, LDLC, LDLCPOC, LDLCEXT, TRIGL, TGLPOCT, CHHD, CHHDX  Lab Results   Component Value Date/Time    TSH 2.050 09/15/2020 08:02 AM         Review of Systems, additional:  A comprehensive review of systems was negative except for that written in the HPI. Objective:     Visit Vitals  /66 (BP 1 Location: Right arm, BP Patient Position: Sitting)   Pulse 83   Temp 98.6 °F (37 °C) (Oral)   Resp 16   Ht 5' 9\" (1.753 m)   Wt 276 lb (125.2 kg)   LMP 10/13/2020   SpO2 98%   BMI 40.76 kg/m²     Appearance: alert, well appearing, and in no distress, oriented to person, place, and time, overweight and acyanotic, in no respiratory distress.   General exam: CVS exam BP noted to be well controlled today in office, S1, S2 normal, no gallop, no murmur, chest clear, no JVD, no HSM, no edema, diabetic exam heart sounds normal rate, regular rhythm, normal S1, S2, no murmurs, rubs, clicks or gallops, chest clear, no hepatosplenomegaly, no carotid bruits, peripheral vascular exam color, temperature, sensation in feet normal, no lesions, neurological exam alert, oriented, normal speech, no focal findings or movement disorder noted, screening mental status exam normal, neck supple without rigidity, motor and sensory grossly normal bilaterally, normal muscle tone, no tremors, strength 5/5. Lab review: labs are reviewed, up to date and normal.     Assessment/Plan:     Patient stable and lab normal therefore we will not make any changes to current regimen    Continue with dietary changes and if blood pressure continues to decrease we may consider reducing or stopping blood pressure medication in the future    In the meantime continue with medication as directed    Follow-up in 3 months for reevaluation or sooner as needed    ICD-10-CM ICD-9-CM    1. Benign hypertension  I10 401.1 CBC WITH AUTOMATED DIFF      METABOLIC PANEL, COMPREHENSIVE      THYROID CASCADE PROFILE      MICROALBUMIN, UR, RAND W/ MICROALB/CREAT RATIO      lisinopriL (PRINIVIL, ZESTRIL) 10 mg tablet   2. GERD without esophagitis  K21.9 530.81 pantoprazole (PROTONIX) 20 mg tablet   3. B12 deficiency  E53.8 266.2 VITAMIN B12   4. Irregular menses  N92.6 626.4    5. Obesity, morbid ([address]  E66.01 278.01 ondansetron hcl (ZOFRAN) 8 mg tablet   6.  Hyperlipidemia, unspecified hyperlipidemia type  E78.5 272.4 LIPID PANEL

## 2021-01-11 ENCOUNTER — CLINICAL SUPPORT (OUTPATIENT)
Dept: FAMILY MEDICINE CLINIC | Age: 26
End: 2021-01-11
Payer: COMMERCIAL

## 2021-01-11 VITALS
BODY MASS INDEX: 40.43 KG/M2 | OXYGEN SATURATION: 98 % | DIASTOLIC BLOOD PRESSURE: 65 MMHG | SYSTOLIC BLOOD PRESSURE: 122 MMHG | HEART RATE: 87 BPM | RESPIRATION RATE: 16 BRPM | TEMPERATURE: 98.1 F | HEIGHT: 69 IN | WEIGHT: 273 LBS

## 2021-01-11 DIAGNOSIS — E53.8 B12 DEFICIENCY: Primary | ICD-10-CM

## 2021-01-11 PROCEDURE — 96372 THER/PROPH/DIAG INJ SC/IM: CPT | Performed by: NURSE PRACTITIONER

## 2021-01-11 RX ADMIN — CYANOCOBALAMIN 1000 MCG: 1000 INJECTION, SOLUTION INTRAMUSCULAR; SUBCUTANEOUS at 09:01

## 2021-01-13 ENCOUNTER — TELEPHONE (OUTPATIENT)
Dept: FAMILY MEDICINE CLINIC | Age: 26
End: 2021-01-13

## 2021-01-13 NOTE — TELEPHONE ENCOUNTER
Patient called back. She has a fever blister and OTC meds are not helping. Patient advised to schedule a virtual visit.

## 2021-02-12 RX ORDER — NALTREXONE HYDROCHLORIDE AND BUPROPION HYDROCHLORIDE 8; 90 MG/1; MG/1
TABLET, EXTENDED RELEASE ORAL
Qty: 120 TAB | Refills: 5 | Status: SHIPPED | OUTPATIENT
Start: 2021-02-12 | End: 2021-03-05 | Stop reason: SDUPTHER

## 2021-03-05 ENCOUNTER — OFFICE VISIT (OUTPATIENT)
Dept: FAMILY MEDICINE CLINIC | Age: 26
End: 2021-03-05
Payer: COMMERCIAL

## 2021-03-05 VITALS
HEART RATE: 78 BPM | WEIGHT: 262 LBS | SYSTOLIC BLOOD PRESSURE: 119 MMHG | OXYGEN SATURATION: 98 % | DIASTOLIC BLOOD PRESSURE: 75 MMHG | BODY MASS INDEX: 38.8 KG/M2 | HEIGHT: 69 IN | TEMPERATURE: 98.6 F | RESPIRATION RATE: 16 BRPM

## 2021-03-05 DIAGNOSIS — I10 ESSENTIAL HYPERTENSION: Primary | ICD-10-CM

## 2021-03-05 DIAGNOSIS — L30.9 DERMATITIS: ICD-10-CM

## 2021-03-05 DIAGNOSIS — I10 BENIGN HYPERTENSION: ICD-10-CM

## 2021-03-05 DIAGNOSIS — K21.9 GERD WITHOUT ESOPHAGITIS: ICD-10-CM

## 2021-03-05 DIAGNOSIS — E66.01 OBESITY, MORBID (HCC): ICD-10-CM

## 2021-03-05 PROCEDURE — 96372 THER/PROPH/DIAG INJ SC/IM: CPT | Performed by: NURSE PRACTITIONER

## 2021-03-05 PROCEDURE — 99214 OFFICE O/P EST MOD 30 MIN: CPT | Performed by: NURSE PRACTITIONER

## 2021-03-05 RX ORDER — PANTOPRAZOLE SODIUM 20 MG/1
20 TABLET, DELAYED RELEASE ORAL DAILY
Qty: 90 TAB | Refills: 1 | Status: SHIPPED | OUTPATIENT
Start: 2021-03-05 | End: 2021-12-15 | Stop reason: ALTCHOICE

## 2021-03-05 RX ORDER — NALTREXONE HYDROCHLORIDE AND BUPROPION HYDROCHLORIDE 8; 90 MG/1; MG/1
2 TABLET, EXTENDED RELEASE ORAL 2 TIMES DAILY
Qty: 120 TAB | Refills: 2 | Status: SHIPPED | OUTPATIENT
Start: 2021-03-05 | End: 2021-06-11 | Stop reason: SDUPTHER

## 2021-03-05 RX ORDER — ONDANSETRON HYDROCHLORIDE 8 MG/1
8 TABLET, FILM COATED ORAL
Qty: 21 TAB | Refills: 5 | Status: SHIPPED | OUTPATIENT
Start: 2021-03-05 | End: 2021-06-11 | Stop reason: SDUPTHER

## 2021-03-05 RX ORDER — TRIAMCINOLONE ACETONIDE 0.25 MG/G
OINTMENT TOPICAL 2 TIMES DAILY
Qty: 30 G | Refills: 0 | Status: SHIPPED | OUTPATIENT
Start: 2021-03-05 | End: 2022-09-27 | Stop reason: SDUPTHER

## 2021-03-05 RX ADMIN — CYANOCOBALAMIN 1000 MCG: 1000 INJECTION, SOLUTION INTRAMUSCULAR; SUBCUTANEOUS at 15:18

## 2021-03-05 NOTE — PROGRESS NOTES
Brooke Reynoso (: 1995) is a 26 y.o. female, established patient, here for evaluation of the following chief complaint(s):  Follow-up (weight loss contrave, htn, maybe stop now) and Hypertension       ASSESSMENT/PLAN:  1. Essential hypertension  -     naltrexone-buPROPion (Contrave) 8-90 mg TbER ER tablet; Take 2 Tabs by mouth two (2) times a day. Indications: weight loss management for an obese person, Normal, Disp-120 Tab, R-2  2. Obesity, morbid (HCC)  -     ondansetron hcl (ZOFRAN) 8 mg tablet; Take 1 Tab by mouth every eight (8) hours as needed for Nausea or Nausea or Vomiting., Normal, Disp-21 Tab, R-5  -     naltrexone-buPROPion (Contrave) 8-90 mg TbER ER tablet; Take 2 Tabs by mouth two (2) times a day. Indications: weight loss management for an obese person, Normal, Disp-120 Tab, R-2  3. GERD without esophagitis  -     pantoprazole (PROTONIX) 20 mg tablet; Take 1 Tab by mouth daily. , Normal, Disp-90 Tab, R-1  4. Dermatitis  -     triamcinolone acetonide (KENALOG) 0.025 % ointment; Apply  to affected area two (2) times a day. use thin layer for 14 days, then stop, Normal, Disp-30 g, R-0  5. Benign hypertension      Return in about 3 months (around 2021) for stopped lisinopril last visit, weight management. .      SUBJECTIVE/OBJECTIVE:  HPI  Patient presenting for hypertension followup, hyperlipidemia check, obesity evaluation. 40 min qam walk every day Patient reports blood pressures at home most frequently normal. Patient has symptoms of none of the following; no chest pain, shortness of breath, weakness, orthostatic hypotension, cough, myalgias, rash, headaches, weight gain, leg swelling, palpitations, slow heart rate, fatigue, depression. Patient reports good compliance with medications, no side effects from medications noted. Current treatments include diet modification, weight loss. Review of Systems  All other systems reviewed and are negative, vital signs reviewed.   Visit Vitals  BP 119/75 (BP 1 Location: Left arm, BP Patient Position: Sitting, BP Cuff Size: Adult long)   Pulse 78   Temp 98.6 °F (37 °C) (Temporal)   Resp 16   Ht 5' 9\" (1.753 m)   Wt 262 lb (118.8 kg)   LMP 02/07/2021   SpO2 98%   BMI 38.69 kg/m²     Physical Exam  Constitutional:  No acute distress  HEENT:  Head normocephalic and atraumatic. CV:  Regular rate and rhythm. No murmur. Respiratory:  Lungs clear to auscultation bilaterally  Abdomen:  Soft, non-tender. Skin:  Normal color. Warm and Dry  Extremities:  Non-tender. No pedal edema. Back:  No tenderness  Neuro:  No gross motor deficits    An electronic signature was used to authenticate this note.   -- Ce Young NP

## 2021-05-05 ENCOUNTER — CLINICAL SUPPORT (OUTPATIENT)
Dept: FAMILY MEDICINE CLINIC | Age: 26
End: 2021-05-05
Payer: COMMERCIAL

## 2021-05-05 VITALS
DIASTOLIC BLOOD PRESSURE: 88 MMHG | HEIGHT: 69 IN | TEMPERATURE: 97.8 F | SYSTOLIC BLOOD PRESSURE: 112 MMHG | OXYGEN SATURATION: 98 % | BODY MASS INDEX: 38.21 KG/M2 | RESPIRATION RATE: 16 BRPM | WEIGHT: 258 LBS | HEART RATE: 78 BPM

## 2021-05-05 DIAGNOSIS — E53.8 B12 DEFICIENCY: Primary | ICD-10-CM

## 2021-05-05 PROCEDURE — 96372 THER/PROPH/DIAG INJ SC/IM: CPT | Performed by: NURSE PRACTITIONER

## 2021-05-05 RX ADMIN — CYANOCOBALAMIN 1000 MCG: 1000 INJECTION, SOLUTION INTRAMUSCULAR; SUBCUTANEOUS at 14:16

## 2021-06-08 LAB
ALBUMIN SERPL-MCNC: 4.4 G/DL (ref 3.9–5)
ALBUMIN/CREAT UR: 2 MG/G CREAT (ref 0–29)
ALBUMIN/GLOB SERPL: 2.2 {RATIO} (ref 1.2–2.2)
ALP SERPL-CCNC: 77 IU/L (ref 48–121)
ALT SERPL-CCNC: 11 IU/L (ref 0–32)
AST SERPL-CCNC: 11 IU/L (ref 0–40)
BASOPHILS # BLD AUTO: 0 X10E3/UL (ref 0–0.2)
BASOPHILS NFR BLD AUTO: 0 %
BILIRUB SERPL-MCNC: 0.3 MG/DL (ref 0–1.2)
BUN SERPL-MCNC: 14 MG/DL (ref 6–20)
BUN/CREAT SERPL: 18 (ref 9–23)
CALCIUM SERPL-MCNC: 9.4 MG/DL (ref 8.7–10.2)
CHLORIDE SERPL-SCNC: 100 MMOL/L (ref 96–106)
CHOLEST SERPL-MCNC: 182 MG/DL (ref 100–199)
CO2 SERPL-SCNC: 26 MMOL/L (ref 20–29)
CREAT SERPL-MCNC: 0.8 MG/DL (ref 0.57–1)
CREAT UR-MCNC: 254.1 MG/DL
EOSINOPHIL # BLD AUTO: 0.1 X10E3/UL (ref 0–0.4)
EOSINOPHIL NFR BLD AUTO: 1 %
ERYTHROCYTE [DISTWIDTH] IN BLOOD BY AUTOMATED COUNT: 12.9 % (ref 11.7–15.4)
GLOBULIN SER CALC-MCNC: 2 G/DL (ref 1.5–4.5)
GLUCOSE SERPL-MCNC: 86 MG/DL (ref 65–99)
HCT VFR BLD AUTO: 42.7 % (ref 34–46.6)
HDLC SERPL-MCNC: 61 MG/DL
HGB BLD-MCNC: 13.3 G/DL (ref 11.1–15.9)
IMM GRANULOCYTES # BLD AUTO: 0 X10E3/UL (ref 0–0.1)
IMM GRANULOCYTES NFR BLD AUTO: 0 %
LDLC SERPL CALC-MCNC: 108 MG/DL (ref 0–99)
LYMPHOCYTES # BLD AUTO: 1.8 X10E3/UL (ref 0.7–3.1)
LYMPHOCYTES NFR BLD AUTO: 26 %
MCH RBC QN AUTO: 27.4 PG (ref 26.6–33)
MCHC RBC AUTO-ENTMCNC: 31.1 G/DL (ref 31.5–35.7)
MCV RBC AUTO: 88 FL (ref 79–97)
MICROALBUMIN UR-MCNC: 5.4 UG/ML
MONOCYTES # BLD AUTO: 0.3 X10E3/UL (ref 0.1–0.9)
MONOCYTES NFR BLD AUTO: 5 %
NEUTROPHILS # BLD AUTO: 4.7 X10E3/UL (ref 1.4–7)
NEUTROPHILS NFR BLD AUTO: 68 %
PLATELET # BLD AUTO: 270 X10E3/UL (ref 150–450)
POTASSIUM SERPL-SCNC: 4.1 MMOL/L (ref 3.5–5.2)
PROT SERPL-MCNC: 6.4 G/DL (ref 6–8.5)
RBC # BLD AUTO: 4.86 X10E6/UL (ref 3.77–5.28)
SODIUM SERPL-SCNC: 140 MMOL/L (ref 134–144)
TRIGL SERPL-MCNC: 70 MG/DL (ref 0–149)
TSH SERPL DL<=0.005 MIU/L-ACNC: 1.36 UIU/ML (ref 0.45–4.5)
VIT B12 SERPL-MCNC: 469 PG/ML (ref 232–1245)
VLDLC SERPL CALC-MCNC: 13 MG/DL (ref 5–40)
WBC # BLD AUTO: 6.9 X10E3/UL (ref 3.4–10.8)

## 2021-06-11 ENCOUNTER — OFFICE VISIT (OUTPATIENT)
Dept: FAMILY MEDICINE CLINIC | Age: 26
End: 2021-06-11
Payer: COMMERCIAL

## 2021-06-11 VITALS
HEART RATE: 82 BPM | WEIGHT: 256 LBS | RESPIRATION RATE: 16 BRPM | HEIGHT: 69 IN | OXYGEN SATURATION: 99 % | DIASTOLIC BLOOD PRESSURE: 73 MMHG | BODY MASS INDEX: 37.92 KG/M2 | SYSTOLIC BLOOD PRESSURE: 118 MMHG | TEMPERATURE: 97.5 F

## 2021-06-11 DIAGNOSIS — E66.01 OBESITY, MORBID (HCC): ICD-10-CM

## 2021-06-11 DIAGNOSIS — K21.9 GERD WITHOUT ESOPHAGITIS: ICD-10-CM

## 2021-06-11 DIAGNOSIS — N92.6 IRREGULAR MENSES: ICD-10-CM

## 2021-06-11 DIAGNOSIS — E53.8 B12 DEFICIENCY: Primary | ICD-10-CM

## 2021-06-11 DIAGNOSIS — I10 ESSENTIAL HYPERTENSION: ICD-10-CM

## 2021-06-11 DIAGNOSIS — I10 BENIGN HYPERTENSION: ICD-10-CM

## 2021-06-11 PROCEDURE — 96372 THER/PROPH/DIAG INJ SC/IM: CPT | Performed by: NURSE PRACTITIONER

## 2021-06-11 PROCEDURE — 99214 OFFICE O/P EST MOD 30 MIN: CPT | Performed by: NURSE PRACTITIONER

## 2021-06-11 RX ORDER — ONDANSETRON HYDROCHLORIDE 8 MG/1
8 TABLET, FILM COATED ORAL
Qty: 21 TABLET | Refills: 5 | Status: SHIPPED | OUTPATIENT
Start: 2021-06-11 | End: 2022-04-28 | Stop reason: SDUPTHER

## 2021-06-11 RX ORDER — NALTREXONE HYDROCHLORIDE AND BUPROPION HYDROCHLORIDE 8; 90 MG/1; MG/1
2 TABLET, EXTENDED RELEASE ORAL 2 TIMES DAILY
Qty: 120 TABLET | Refills: 5 | Status: SHIPPED | OUTPATIENT
Start: 2021-06-11 | End: 2021-12-22

## 2021-06-11 RX ORDER — IBUPROFEN 800 MG/1
800 TABLET ORAL
Qty: 30 TABLET | Refills: 5 | Status: SHIPPED | OUTPATIENT
Start: 2021-06-11 | End: 2022-06-14

## 2021-06-11 RX ADMIN — CYANOCOBALAMIN 1000 MCG: 1000 INJECTION, SOLUTION INTRAMUSCULAR; SUBCUTANEOUS at 09:00

## 2021-06-11 NOTE — PROGRESS NOTES
Chief Complaint   Patient presents with    Follow-up     Return in about 3 months (around 6/5/2021) for stopped lisinopril last visit, weight management    Hypertension     Has been having headaches but BP is normal in clinic today. Eye exam is scheduled for 06/22/2021.  Weight Management     Is going down in sizes on her clothes but is frustrated that she is not seeing a change on the scale     Headache     1. Have you been to the ER, urgent care clinic since your last visit? Hospitalized since your last visit? No    2. Have you seen or consulted any other health care providers outside of the [address] [place] [address] since your last visit? Include any pap smears or colon screening.  No     Visit Vitals  /73 (BP 1 Location: Left arm, BP Patient Position: Sitting, BP Cuff Size: Adult)   Pulse 82   Temp 97.5 °F (36.4 °C) (Temporal)   Resp 16   Ht 5' 9\" (1.753 m)   Wt 256 lb (116.1 kg)   LMP 05/23/2021   SpO2 99%   BMI 37.80 kg/m²

## 2021-06-11 NOTE — PROGRESS NOTES
Becki Qureshi (: 1995) is a 23 y.o. female, established patient, here for evaluation of the following chief complaint(s):  Follow-up, Hypertension, Weight Management, and Headache       ASSESSMENT/PLAN:  Below is the assessment and plan developed based on review of pertinent history, physical exam, labs, studies, and medications. 1. B12 deficiency  -     cyanocobalamin (VITAMIN B12) injection 1,000 mcg; 1,000 mcg, IntraMUSCular, ONCE, 1 dose, On Sun 21 at 1100  2. GERD without esophagitis  3. Irregular menses  4. Benign hypertension  5. Obesity, morbid ([address]  -     naltrexone-buPROPion (Contrave) 8-90 mg TbER ER tablet; Take 2 Tablets by mouth two (2) times a day. Indications: weight loss management for an obese person, Normal, Disp-120 Tablet, R-5  -     ondansetron hcl (ZOFRAN) 8 mg tablet; Take 1 Tablet by mouth every eight (8) hours as needed for Nausea or Nausea or Vomiting., Normal, Disp-21 Tablet, R-5  -     ibuprofen (MOTRIN) 800 mg tablet; Take 1 Tablet by mouth every eight (8) hours as needed for Pain. Indications: pain, Normal, Disp-30 Tablet, R-5  6. Essential hypertension  -     naltrexone-buPROPion (Contrave) 8-90 mg TbER ER tablet; Take 2 Tablets by mouth two (2) times a day. Indications: weight loss management for an obese person, Normal, Disp-120 Tablet, R-5  Blood pressure stable unlikely that headache caused by blood pressure issues  Follow-up with eye exam as previously scheduled  Continue with dietary efforts  Return in about 6 months (around 2021) for Hyperlipidemia, HTN, Weight management, B12.      SUBJECTIVE/OBJECTIVE:  HPI  Follow-up (Return in about 3 months (around 2021) for stopped lisinopril last visit, weight management), Hypertension (Has been having headaches but BP is normal in clinic today.   Eye exam is scheduled for 2021.  ), Weight Management (Is going down in sizes on her clothes but is frustrated that she is not seeing a change on the scale ), and Headache (have been reading a lot of books over a short time span)    Review of Systems   All other systems reviewed and are negative. Visit Vitals  /73 (BP 1 Location: Left arm, BP Patient Position: Sitting, BP Cuff Size: Adult)   Pulse 82   Temp 97.5 °F (36.4 °C) (Temporal)   Resp 16   Ht 5' 9\" (1.753 m)   Wt 256 lb (116.1 kg)   LMP 05/23/2021   SpO2 99%   BMI 37.80 kg/m²     Physical Exam  Constitutional:  No acute distress  HEENT:  Head normocephalic and atraumatic. CV:  Regular rate and rhythm. No murmur. Respiratory:  Lungs clear to auscultation bilaterally  Abdomen:  Soft, non-tender. Skin:  Normal color. Warm and Dry  Extremities:  Non-tender. No pedal edema. Back:  No tenderness  Neuro:  No gross motor deficits    On this date 06/11/2021 I have spent 31 minutes reviewing previous notes, test results and face to face with the patient discussing the diagnosis and importance of compliance with the treatment plan as well as documenting on the day of the visit. An electronic signature was used to authenticate this note.   -- Brian Goel NP

## 2021-06-13 RX ORDER — CYANOCOBALAMIN 1000 UG/ML
1000 INJECTION, SOLUTION INTRAMUSCULAR; SUBCUTANEOUS ONCE
Status: SHIPPED | OUTPATIENT
Start: 2021-06-13 | End: 2021-06-13

## 2021-07-09 ENCOUNTER — CLINICAL SUPPORT (OUTPATIENT)
Dept: FAMILY MEDICINE CLINIC | Age: 26
End: 2021-07-09
Payer: COMMERCIAL

## 2021-07-09 VITALS
HEART RATE: 86 BPM | TEMPERATURE: 97.7 F | HEIGHT: 69 IN | DIASTOLIC BLOOD PRESSURE: 85 MMHG | BODY MASS INDEX: 37.59 KG/M2 | WEIGHT: 253.8 LBS | OXYGEN SATURATION: 98 % | SYSTOLIC BLOOD PRESSURE: 140 MMHG

## 2021-07-09 DIAGNOSIS — E53.8 B12 DEFICIENCY: Primary | ICD-10-CM

## 2021-07-09 PROCEDURE — 96372 THER/PROPH/DIAG INJ SC/IM: CPT | Performed by: FAMILY MEDICINE

## 2021-07-09 RX ORDER — CYANOCOBALAMIN 1000 UG/ML
1000 INJECTION, SOLUTION INTRAMUSCULAR; SUBCUTANEOUS ONCE
Status: COMPLETED | OUTPATIENT
Start: 2021-07-09 | End: 2021-07-09

## 2021-07-09 RX ADMIN — CYANOCOBALAMIN 1000 MCG: 1000 INJECTION, SOLUTION INTRAMUSCULAR; SUBCUTANEOUS at 08:52

## 2021-08-06 ENCOUNTER — CLINICAL SUPPORT (OUTPATIENT)
Dept: FAMILY MEDICINE CLINIC | Age: 26
End: 2021-08-06
Payer: COMMERCIAL

## 2021-08-06 VITALS
RESPIRATION RATE: 16 BRPM | TEMPERATURE: 98.3 F | HEIGHT: 69 IN | HEART RATE: 80 BPM | BODY MASS INDEX: 37.47 KG/M2 | OXYGEN SATURATION: 98 % | WEIGHT: 253 LBS | SYSTOLIC BLOOD PRESSURE: 125 MMHG | DIASTOLIC BLOOD PRESSURE: 77 MMHG

## 2021-08-06 DIAGNOSIS — E53.8 VITAMIN B 12 DEFICIENCY: Primary | ICD-10-CM

## 2021-08-06 PROCEDURE — 96372 THER/PROPH/DIAG INJ SC/IM: CPT | Performed by: NURSE PRACTITIONER

## 2021-08-06 RX ADMIN — CYANOCOBALAMIN 1000 MCG: 1000 INJECTION, SOLUTION INTRAMUSCULAR; SUBCUTANEOUS at 09:22

## 2021-09-07 ENCOUNTER — CLINICAL SUPPORT (OUTPATIENT)
Dept: FAMILY MEDICINE CLINIC | Age: 26
End: 2021-09-07

## 2021-09-07 VITALS
DIASTOLIC BLOOD PRESSURE: 86 MMHG | SYSTOLIC BLOOD PRESSURE: 124 MMHG | TEMPERATURE: 97.7 F | RESPIRATION RATE: 16 BRPM | WEIGHT: 252.6 LBS | BODY MASS INDEX: 37.3 KG/M2 | HEART RATE: 76 BPM | OXYGEN SATURATION: 99 %

## 2021-09-07 DIAGNOSIS — E53.8 VITAMIN B 12 DEFICIENCY: Primary | ICD-10-CM

## 2021-09-07 PROCEDURE — 96372 THER/PROPH/DIAG INJ SC/IM: CPT | Performed by: NURSE PRACTITIONER

## 2021-09-07 RX ADMIN — CYANOCOBALAMIN 1000 MCG: 1000 INJECTION, SOLUTION INTRAMUSCULAR; SUBCUTANEOUS at 10:49

## 2021-09-30 ENCOUNTER — TELEPHONE (OUTPATIENT)
Dept: FAMILY MEDICINE CLINIC | Age: 26
End: 2021-09-30

## 2021-09-30 NOTE — TELEPHONE ENCOUNTER
Patient inquired as to whether or not she is able to get COVID vaccine based upon medical history and medications that she is taking. Patient would also like to know if receiving the Debera Una and Debera Una vaccine would be appropriate for her. Please advise.

## 2021-10-11 ENCOUNTER — CLINICAL SUPPORT (OUTPATIENT)
Dept: FAMILY MEDICINE CLINIC | Age: 26
End: 2021-10-11
Payer: COMMERCIAL

## 2021-10-11 VITALS
HEIGHT: 69 IN | RESPIRATION RATE: 18 BRPM | WEIGHT: 247 LBS | BODY MASS INDEX: 36.58 KG/M2 | TEMPERATURE: 98.2 F | OXYGEN SATURATION: 99 % | DIASTOLIC BLOOD PRESSURE: 83 MMHG | HEART RATE: 76 BPM | SYSTOLIC BLOOD PRESSURE: 121 MMHG

## 2021-10-11 DIAGNOSIS — E53.8 B12 DEFICIENCY: Primary | ICD-10-CM

## 2021-10-11 PROCEDURE — 96372 THER/PROPH/DIAG INJ SC/IM: CPT | Performed by: NURSE PRACTITIONER

## 2021-10-11 RX ORDER — CYANOCOBALAMIN 1000 UG/ML
1000 INJECTION, SOLUTION INTRAMUSCULAR; SUBCUTANEOUS ONCE
Status: COMPLETED | OUTPATIENT
Start: 2021-10-11 | End: 2021-10-11

## 2021-10-11 RX ADMIN — CYANOCOBALAMIN 1000 MCG: 1000 INJECTION, SOLUTION INTRAMUSCULAR; SUBCUTANEOUS at 13:44

## 2021-11-10 ENCOUNTER — TELEPHONE (OUTPATIENT)
Dept: FAMILY MEDICINE CLINIC | Age: 26
End: 2021-11-10

## 2021-11-10 ENCOUNTER — CLINICAL SUPPORT (OUTPATIENT)
Dept: FAMILY MEDICINE CLINIC | Age: 26
End: 2021-11-10
Payer: COMMERCIAL

## 2021-11-10 VITALS
WEIGHT: 250 LBS | HEART RATE: 77 BPM | SYSTOLIC BLOOD PRESSURE: 128 MMHG | TEMPERATURE: 98 F | RESPIRATION RATE: 18 BRPM | BODY MASS INDEX: 37.03 KG/M2 | DIASTOLIC BLOOD PRESSURE: 84 MMHG | OXYGEN SATURATION: 96 % | HEIGHT: 69 IN

## 2021-11-10 DIAGNOSIS — E53.8 VITAMIN B12 DEFICIENCY: Primary | ICD-10-CM

## 2021-11-10 PROCEDURE — 96372 THER/PROPH/DIAG INJ SC/IM: CPT | Performed by: NURSE PRACTITIONER

## 2021-11-10 RX ORDER — CYANOCOBALAMIN 1000 UG/ML
1000 INJECTION, SOLUTION INTRAMUSCULAR; SUBCUTANEOUS ONCE
Status: COMPLETED | OUTPATIENT
Start: 2021-11-10 | End: 2021-11-10

## 2021-11-10 RX ADMIN — CYANOCOBALAMIN 1000 MCG: 1000 INJECTION, SOLUTION INTRAMUSCULAR; SUBCUTANEOUS at 14:03

## 2021-11-10 NOTE — TELEPHONE ENCOUNTER
Pt came in the office today (11/10/2021) for her Vitamin B12 injection. Pt states she want to start giving her self her injection. Pt want to know if you can call in some Rx and needles with syringe to her RA pharmacy at the [facility] in [place]. Pt states we have the same hours like she do at the [facility] and it hard for her to get in to get the shot.  OMID

## 2021-12-10 ENCOUNTER — OFFICE VISIT (OUTPATIENT)
Dept: FAMILY MEDICINE CLINIC | Age: 26
End: 2021-12-10
Payer: COMMERCIAL

## 2021-12-10 VITALS
BODY MASS INDEX: 36.77 KG/M2 | SYSTOLIC BLOOD PRESSURE: 122 MMHG | RESPIRATION RATE: 18 BRPM | HEART RATE: 83 BPM | DIASTOLIC BLOOD PRESSURE: 78 MMHG | TEMPERATURE: 97.5 F | WEIGHT: 249 LBS | OXYGEN SATURATION: 100 %

## 2021-12-10 DIAGNOSIS — E78.5 HYPERLIPIDEMIA, UNSPECIFIED HYPERLIPIDEMIA TYPE: ICD-10-CM

## 2021-12-10 DIAGNOSIS — N92.6 IRREGULAR MENSES: ICD-10-CM

## 2021-12-10 DIAGNOSIS — K21.9 GERD WITHOUT ESOPHAGITIS: ICD-10-CM

## 2021-12-10 DIAGNOSIS — E53.8 VITAMIN B 12 DEFICIENCY: Primary | ICD-10-CM

## 2021-12-10 DIAGNOSIS — M75.51 CHRONIC SHOULDER BURSITIS, RIGHT: ICD-10-CM

## 2021-12-10 DIAGNOSIS — I10 ESSENTIAL HYPERTENSION: ICD-10-CM

## 2021-12-10 PROCEDURE — 96372 THER/PROPH/DIAG INJ SC/IM: CPT | Performed by: NURSE PRACTITIONER

## 2021-12-10 PROCEDURE — 99214 OFFICE O/P EST MOD 30 MIN: CPT | Performed by: NURSE PRACTITIONER

## 2021-12-10 RX ORDER — PREDNISONE 20 MG/1
20 TABLET ORAL 2 TIMES DAILY WITH MEALS
Qty: 20 TABLET | Refills: 0 | Status: SHIPPED | OUTPATIENT
Start: 2021-12-10 | End: 2022-07-13 | Stop reason: ALTCHOICE

## 2021-12-10 RX ORDER — CYANOCOBALAMIN 1000 UG/ML
1000 INJECTION, SOLUTION INTRAMUSCULAR; SUBCUTANEOUS ONCE
Status: COMPLETED | OUTPATIENT
Start: 2021-12-10 | End: 2021-12-10

## 2021-12-10 RX ADMIN — CYANOCOBALAMIN 1000 MCG: 1000 INJECTION, SOLUTION INTRAMUSCULAR; SUBCUTANEOUS at 08:46

## 2021-12-10 NOTE — PROGRESS NOTES
Aurora Oneil (: 1995) is a 23 y.o. female, established patient, here for evaluation of the following chief complaint(s):  Follow Up Chronic Condition, Arm Pain, Chest Pain, and Injection B12         ASSESSMENT/PLAN:  Below is the assessment and plan developed based on review of pertinent history, physical exam, labs, studies, and medications. 1. Vitamin B 12 deficiency  -     cyanocobalamin (VITAMIN B12) injection 1,000 mcg; 1,000 mcg, IntraMUSCular, ONCE, 1 dose, On Fri 12/10/21 at 0900  2. Chronic shoulder bursitis, right  -     predniSONE (DELTASONE) 20 mg tablet; Take 20 mg by mouth two (2) times daily (with meals). , Normal, Disp-20 Tablet, R-0  3. GERD without esophagitis  4. Irregular menses  5. Essential hypertension  -     CBC WITH AUTOMATED DIFF  -     METABOLIC PANEL, COMPREHENSIVE  -     LIPID PANEL  -     THYROID CASCADE PROFILE  -     MICROALBUMIN, UR, RAND W/ MICROALB/CREAT RATIO  6. Hyperlipidemia, unspecified hyperlipidemia type  -     CBC WITH AUTOMATED DIFF  -     METABOLIC PANEL, COMPREHENSIVE  -     LIPID PANEL  -     THYROID CASCADE PROFILE        Return in about 6 months (around 6/10/2022) for HTN, Hyperlipidemia, gerd, R arm pain? Lab review. SUBJECTIVE/OBJECTIVE:  HPI  Follow Up Chronic Condition, Arm Pain (C/o right arm pain possibly related to typing at work ), Chest Pain (C/o chest pain over the last week, patient describes pain as infrequent, pressure type pain throughout the day), and Injection B12    Since getting off birth controll have been getting cyst like lesions of the breast lasting a few days every couple months, resolves by itself    Chest pain mostly in the am  Have taken protonix x 1 only    Right shouldere pain hx of fracture as a child, more demanding job, school, righ hand dominance, no new injuries. Review of Systems  All other systems reviewed and are negative.   Visit Vitals  /78 (BP 1 Location: Left upper arm, BP Patient Position: Sitting, BP Cuff Size: Adult)   Pulse 83   Temp 97.5 °F (36.4 °C) (Skin)   Resp 18   Wt 249 lb (112.9 kg)   LMP 11/29/2021 (Exact Date)   SpO2 100%   BMI 36.77 kg/m²       Physical Exam  Constitutional:  No acute distress  HEENT:  Head normocephalic and atraumatic. CV:  Regular rate and rhythm. No murmur. Respiratory:  Lungs clear to auscultation bilaterally  Abdomen:  Soft, non-tender. Skin:  Normal color. Warm and Dry  Extremities:  Non-tender. No pedal edema. Back:  No tenderness  Neuro:  No gross motor deficits    On this date 12/10/2021 I have spent 31 minutes reviewing previous notes, test results and face to face with the patient discussing the diagnosis and importance of compliance with the treatment plan as well as documenting on the day of the visit. Aspects of this note may have been generated using voice recognition software. Despite editing, there may be some syntax errors. An electronic signature was used to authenticate this note.   -- Nicolette Pascual NP

## 2021-12-10 NOTE — PROGRESS NOTES
Chief Complaint   Patient presents with    Follow Up Chronic Condition    Arm Pain     C/o right arm pain possibly related to typing at work     Chest Pain     C/o chest pain over the last week, patient describes pain as infrequent, pressure type pain throughout the day    Injection B12     1. Have you been to the ER, urgent care clinic since your last visit? Hospitalized since your last visit? No    2. Have you seen or consulted any other health care providers outside of the [address] [place] [address] since your last visit? Include any pap smears or colon screening.  No   Visit Vitals  /78 (BP 1 Location: Left upper arm, BP Patient Position: Sitting, BP Cuff Size: Adult)   Pulse 83   Temp 97.5 °F (36.4 °C) (Skin)   Resp 18   Wt 249 lb (112.9 kg)   LMP 11/29/2021 (Exact Date)   SpO2 100%   BMI 36.77 kg/m²

## 2021-12-21 DIAGNOSIS — E66.01 OBESITY, MORBID (HCC): ICD-10-CM

## 2021-12-21 DIAGNOSIS — I10 ESSENTIAL HYPERTENSION: ICD-10-CM

## 2021-12-22 RX ORDER — NALTREXONE HYDROCHLORIDE AND BUPROPION HYDROCHLORIDE 8; 90 MG/1; MG/1
TABLET, EXTENDED RELEASE ORAL
Qty: 120 TABLET | Refills: 5 | Status: SHIPPED | OUTPATIENT
Start: 2021-12-22 | End: 2022-07-13 | Stop reason: SDUPTHER

## 2022-01-11 ENCOUNTER — CLINICAL SUPPORT (OUTPATIENT)
Dept: FAMILY MEDICINE CLINIC | Age: 27
End: 2022-01-11
Payer: COMMERCIAL

## 2022-01-11 DIAGNOSIS — E53.8 B12 DEFICIENCY: Primary | ICD-10-CM

## 2022-01-11 PROCEDURE — 96372 THER/PROPH/DIAG INJ SC/IM: CPT | Performed by: NURSE PRACTITIONER

## 2022-01-11 RX ORDER — CYANOCOBALAMIN 1000 UG/ML
1000 INJECTION, SOLUTION INTRAMUSCULAR; SUBCUTANEOUS ONCE
Status: COMPLETED | OUTPATIENT
Start: 2022-01-11 | End: 2022-01-11

## 2022-01-11 RX ADMIN — CYANOCOBALAMIN 1000 MCG: 1000 INJECTION, SOLUTION INTRAMUSCULAR; SUBCUTANEOUS at 09:14

## 2022-02-11 ENCOUNTER — CLINICAL SUPPORT (OUTPATIENT)
Dept: FAMILY MEDICINE CLINIC | Age: 27
End: 2022-02-11
Payer: COMMERCIAL

## 2022-02-11 DIAGNOSIS — E53.8 B12 DEFICIENCY: Primary | ICD-10-CM

## 2022-02-11 PROCEDURE — 96372 THER/PROPH/DIAG INJ SC/IM: CPT | Performed by: NURSE PRACTITIONER

## 2022-02-11 RX ADMIN — CYANOCOBALAMIN 1000 MCG: 1000 INJECTION, SOLUTION INTRAMUSCULAR; SUBCUTANEOUS at 09:17

## 2022-03-11 ENCOUNTER — CLINICAL SUPPORT (OUTPATIENT)
Dept: FAMILY MEDICINE CLINIC | Age: 27
End: 2022-03-11
Payer: COMMERCIAL

## 2022-03-11 DIAGNOSIS — E53.8 B12 DEFICIENCY: Primary | ICD-10-CM

## 2022-03-11 PROCEDURE — 96372 THER/PROPH/DIAG INJ SC/IM: CPT | Performed by: NURSE PRACTITIONER

## 2022-03-11 RX ADMIN — CYANOCOBALAMIN 1000 MCG: 1000 INJECTION, SOLUTION INTRAMUSCULAR; SUBCUTANEOUS at 09:11

## 2022-03-19 PROBLEM — K21.9 GERD WITHOUT ESOPHAGITIS: Status: ACTIVE | Noted: 2020-08-13

## 2022-03-19 PROBLEM — N92.6 IRREGULAR MENSES: Status: ACTIVE | Noted: 2020-08-13

## 2022-03-19 PROBLEM — I10 BENIGN HYPERTENSION: Status: ACTIVE | Noted: 2020-08-13

## 2022-03-19 PROBLEM — N92.1 MENORRHAGIA WITH IRREGULAR CYCLE: Status: ACTIVE | Noted: 2020-11-07

## 2022-03-19 PROBLEM — N92.6 PROLONGED PERIODS: Status: ACTIVE | Noted: 2020-11-07

## 2022-03-20 PROBLEM — E53.8 B12 DEFICIENCY: Status: ACTIVE | Noted: 2020-08-13

## 2022-03-20 PROBLEM — E66.01 OBESITY, MORBID (HCC): Status: ACTIVE | Noted: 2020-08-11

## 2022-04-08 ENCOUNTER — CLINICAL SUPPORT (OUTPATIENT)
Dept: FAMILY MEDICINE CLINIC | Age: 27
End: 2022-04-08
Payer: COMMERCIAL

## 2022-04-08 DIAGNOSIS — E53.8 B12 DEFICIENCY: Primary | ICD-10-CM

## 2022-04-08 PROCEDURE — 96372 THER/PROPH/DIAG INJ SC/IM: CPT | Performed by: NURSE PRACTITIONER

## 2022-04-08 RX ADMIN — CYANOCOBALAMIN 1000 MCG: 1000 INJECTION, SOLUTION INTRAMUSCULAR; SUBCUTANEOUS at 08:28

## 2022-04-28 DIAGNOSIS — E66.01 OBESITY, MORBID (HCC): ICD-10-CM

## 2022-04-29 RX ORDER — ONDANSETRON HYDROCHLORIDE 8 MG/1
8 TABLET, FILM COATED ORAL
Qty: 21 TABLET | Refills: 5 | Status: SHIPPED | OUTPATIENT
Start: 2022-04-29

## 2022-05-10 ENCOUNTER — CLINICAL SUPPORT (OUTPATIENT)
Dept: FAMILY MEDICINE CLINIC | Age: 27
End: 2022-05-10
Payer: COMMERCIAL

## 2022-05-10 DIAGNOSIS — E53.8 B12 DEFICIENCY: Primary | ICD-10-CM

## 2022-05-10 PROCEDURE — 96372 THER/PROPH/DIAG INJ SC/IM: CPT | Performed by: NURSE PRACTITIONER

## 2022-05-10 RX ADMIN — CYANOCOBALAMIN 1000 MCG: 1000 INJECTION, SOLUTION INTRAMUSCULAR; SUBCUTANEOUS at 08:17

## 2022-06-10 ENCOUNTER — OFFICE VISIT (OUTPATIENT)
Dept: FAMILY MEDICINE CLINIC | Age: 27
End: 2022-06-10
Payer: COMMERCIAL

## 2022-06-10 VITALS
HEART RATE: 66 BPM | RESPIRATION RATE: 18 BRPM | HEIGHT: 69 IN | TEMPERATURE: 97.8 F | WEIGHT: 242.5 LBS | DIASTOLIC BLOOD PRESSURE: 91 MMHG | BODY MASS INDEX: 35.92 KG/M2 | SYSTOLIC BLOOD PRESSURE: 128 MMHG | OXYGEN SATURATION: 97 %

## 2022-06-10 DIAGNOSIS — K05.00 ACUTE GINGIVITIS: ICD-10-CM

## 2022-06-10 DIAGNOSIS — I10 ESSENTIAL HYPERTENSION: ICD-10-CM

## 2022-06-10 DIAGNOSIS — E78.5 HYPERLIPIDEMIA, UNSPECIFIED HYPERLIPIDEMIA TYPE: ICD-10-CM

## 2022-06-10 DIAGNOSIS — E55.9 VITAMIN D DEFICIENCY: ICD-10-CM

## 2022-06-10 DIAGNOSIS — E53.8 VITAMIN B 12 DEFICIENCY: ICD-10-CM

## 2022-06-10 DIAGNOSIS — Z11.59 NEED FOR HEPATITIS C SCREENING TEST: Primary | ICD-10-CM

## 2022-06-10 PROCEDURE — 96372 THER/PROPH/DIAG INJ SC/IM: CPT | Performed by: NURSE PRACTITIONER

## 2022-06-10 PROCEDURE — 99214 OFFICE O/P EST MOD 30 MIN: CPT | Performed by: NURSE PRACTITIONER

## 2022-06-10 RX ORDER — CHLORHEXIDINE GLUCONATE 1.2 MG/ML
15 RINSE ORAL EVERY 12 HOURS
Qty: 420 ML | Refills: 0 | Status: SHIPPED | OUTPATIENT
Start: 2022-06-10 | End: 2022-06-24

## 2022-06-10 RX ORDER — PANTOPRAZOLE SODIUM 40 MG/1
40 TABLET, DELAYED RELEASE ORAL DAILY
COMMUNITY

## 2022-06-10 RX ORDER — CYANOCOBALAMIN 1000 UG/ML
1000 INJECTION, SOLUTION INTRAMUSCULAR; SUBCUTANEOUS ONCE
Status: COMPLETED | OUTPATIENT
Start: 2022-06-10 | End: 2022-06-10

## 2022-06-10 RX ORDER — AMOXICILLIN 500 MG/1
500 CAPSULE ORAL 3 TIMES DAILY
Qty: 30 CAPSULE | Refills: 0 | Status: SHIPPED | OUTPATIENT
Start: 2022-06-10 | End: 2022-06-20

## 2022-06-10 RX ADMIN — CYANOCOBALAMIN 1000 MCG: 1000 INJECTION, SOLUTION INTRAMUSCULAR; SUBCUTANEOUS at 09:54

## 2022-06-10 NOTE — PROGRESS NOTES
Chief Complaint   Patient presents with    Follow-up     lab review    Hypertension    Cholesterol Problem     There were no vitals taken for this visit. 1. \"Have you been to the ER, urgent care clinic since your last visit? Hospitalized since your last visit? \" No    2. \"Have you seen or consulted any other health care providers outside of the [address] [place] [address] since your last visit? \" No     3. For patients aged 39-70: Has the patient had a colonoscopy / FIT/ Cologuard? NA - based on age      If the patient is female:    4. For patients aged 41-77: Has the patient had a mammogram within the past 2 years? NA - based on age or sex      11. For patients aged 21-65: Has the patient had a pap smear?  Yes - no Care Gap present

## 2022-06-10 NOTE — PROGRESS NOTES
Huong Parmar (: 1995) is a 33 y.o. female, established patient, here for evaluation of the following chief complaint(s):  Follow-up, Hypertension, Cholesterol Problem, and Ear Pain         ASSESSMENT/PLAN:  Below is the assessment and plan developed based on review of pertinent history, physical exam, labs, studies, and medications. Based on today's findings we will proceed as follows:     1. Need for hepatitis C screening test  -     HEPATITIS C AB; Future  2. Vitamin B 12 deficiency  -     VITAMIN B12 & FOLATE  -     cyanocobalamin (VITAMIN B12) injection 1,000 mcg; 1,000 mcg, IntraMUSCular, ONCE, 1 dose, On Fri 6/10/22 at 0900  3. Essential hypertension  -     CBC WITH AUTOMATED DIFF  -     METABOLIC PANEL, COMPREHENSIVE  -     LIPID PANEL  -     THYROID CASCADE PROFILE  -     MICROALBUMIN, UR, RAND W/ MICROALB/CREAT RATIO  4. Hyperlipidemia, unspecified hyperlipidemia type  -     CBC WITH AUTOMATED DIFF  -     METABOLIC PANEL, COMPREHENSIVE  -     LIPID PANEL  -     THYROID CASCADE PROFILE  5. Vitamin D deficiency  -     VITAMIN D, 25 HYDROXY  6. Acute gingivitis  -     chlorhexidine (Paroex Oral Rinse) 0.12 % solution; 15 mL by Swish and Spit route every twelve (12) hours for 14 days. , Normal, Disp-420 mL, R-0  -     amoxicillin (AMOXIL) 500 mg capsule; Take 1 Capsule by mouth three (3) times daily for 10 days. , Normal, Disp-30 Capsule, R-0    For the oral issues that are consistent with gingivitis will take amoxicillin and rinse with chlorhexidine oral rinse. Patient is to follow-up with dentist for further evaluation and treatment options. Educated on medication, uses, side effects, as well as signs and symptoms that may merit immediate medical attention. Patient is not to share medication and use only as indicated. Patient verbalized understanding and agreement.      Blood pressure slightly elevated however patient did not take blood pressure medication today, we will not make any changes to her current regimen. For the fatigue with history of B12 and vitamin D deficiency we will do labs to further evaluate    Issues discussed include maintaining a diet, weight control and exercise, continued home blood pressure monitoring, maintaining medication compliance, medication side effects, long term hypertension complications, and refraining from tobacco use. Treatment plan: no changes, labwork from today discussed and reviewed, future labwork ordered, A1C, lipids check, patient up to date on maintence and screening exams. Return in about 6 months (around 12/10/2022) for HTN, Hyperlipidemia, Lab review, Vit D def, Weight management, Annual wellness. SUBJECTIVE/OBJECTIVE:  HPI  Follow-up (lab review), Hypertension (did not take BP meds this morning), Cholesterol Problem, and Ear Pain (right ear feels like it has water in in; has tried OTC ear gtts)  Feeling fatigued in the morning, no energy to go to the gym, have history of vitamin D deficiency and B12 deficiency. Also with onset of gum issues to include swelling, bleeding, and tenderness. Has appointment with dentist next week. Does report past medical history with gingivitis. Patient reports blood pressures at home most frequently normal. Patient has symptoms of none of the following; no chest pain, shortness of breath, weakness, orthostatic hypotension, cough, myalgias, rash, headaches, weight gain, leg swelling, palpitations, slow heart rate, fatigue, depression. Patient reports good compliance with medications, no side effects from medications noted. Current treatments include diet modification. Review of Systems  All other systems reviewed and are negative.   Visit Vitals  BP (!) 128/91 (BP 1 Location: Left upper arm, BP Patient Position: Sitting, BP Cuff Size: Adult)   Pulse 66   Temp 97.8 °F (36.6 °C) (Temporal)   Resp 18   Ht 5' 9\" (1.753 m)   Wt 242 lb 8 oz (110 kg)   LMP 05/19/2022   SpO2 97%   BMI 35.81 kg/m²       Physical Exam  Constitutional:  No acute distress  HEENT:  Head normocephalic and atraumatic. CV:  Regular rate and rhythm. No murmur. Respiratory:  Lungs clear to auscultation bilaterally  Abdomen:  Soft, non-tender. Skin:  Normal color. Warm and Dry  Extremities:  Non-tender. No pedal edema. Back:  No tenderness  Neuro:  No gross motor deficits    On this date 06/10/2022 I have spent 32 minutes reviewing previous notes, test results and face to face with the patient discussing the diagnosis and importance of compliance with the treatment plan as well as documenting on the day of the visit. Aspects of this note may have been generated using voice recognition software. Despite editing, there may be some syntax errors. An electronic signature was used to authenticate this note.   -- Rojelio Vasquez NP

## 2022-06-12 LAB
25(OH)D3+25(OH)D2 SERPL-MCNC: 29.4 NG/ML (ref 30–100)
ALBUMIN SERPL-MCNC: 4.7 G/DL (ref 3.9–5)
ALBUMIN/CREAT UR: 3 MG/G CREAT (ref 0–29)
ALBUMIN/GLOB SERPL: 2 {RATIO} (ref 1.2–2.2)
ALP SERPL-CCNC: 65 IU/L (ref 44–121)
ALT SERPL-CCNC: 18 IU/L (ref 0–32)
AST SERPL-CCNC: 13 IU/L (ref 0–40)
BASOPHILS # BLD AUTO: 0 X10E3/UL (ref 0–0.2)
BASOPHILS NFR BLD AUTO: 0 %
BILIRUB SERPL-MCNC: 0.4 MG/DL (ref 0–1.2)
BUN SERPL-MCNC: 18 MG/DL (ref 6–20)
BUN/CREAT SERPL: 19 (ref 9–23)
CALCIUM SERPL-MCNC: 9.1 MG/DL (ref 8.7–10.2)
CHLORIDE SERPL-SCNC: 103 MMOL/L (ref 96–106)
CHOLEST SERPL-MCNC: 182 MG/DL (ref 100–199)
CO2 SERPL-SCNC: 22 MMOL/L (ref 20–29)
CREAT SERPL-MCNC: 0.96 MG/DL (ref 0.57–1)
CREAT UR-MCNC: 134.7 MG/DL
EGFR: 84 ML/MIN/1.73
EOSINOPHIL # BLD AUTO: 0.1 X10E3/UL (ref 0–0.4)
EOSINOPHIL NFR BLD AUTO: 2 %
ERYTHROCYTE [DISTWIDTH] IN BLOOD BY AUTOMATED COUNT: 13 % (ref 11.7–15.4)
FOLATE SERPL-MCNC: 3.5 NG/ML
GLOBULIN SER CALC-MCNC: 2.3 G/DL (ref 1.5–4.5)
GLUCOSE SERPL-MCNC: 76 MG/DL (ref 65–99)
HCT VFR BLD AUTO: 40.8 % (ref 34–46.6)
HCV AB S/CO SERPL IA: <0.1 S/CO RATIO (ref 0–0.9)
HDLC SERPL-MCNC: 62 MG/DL
HGB BLD-MCNC: 13.1 G/DL (ref 11.1–15.9)
IMM GRANULOCYTES # BLD AUTO: 0 X10E3/UL (ref 0–0.1)
IMM GRANULOCYTES NFR BLD AUTO: 0 %
LDLC SERPL CALC-MCNC: 110 MG/DL (ref 0–99)
LYMPHOCYTES # BLD AUTO: 1.6 X10E3/UL (ref 0.7–3.1)
LYMPHOCYTES NFR BLD AUTO: 31 %
MCH RBC QN AUTO: 27.9 PG (ref 26.6–33)
MCHC RBC AUTO-ENTMCNC: 32.1 G/DL (ref 31.5–35.7)
MCV RBC AUTO: 87 FL (ref 79–97)
MICROALBUMIN UR-MCNC: 4.6 UG/ML
MONOCYTES # BLD AUTO: 0.3 X10E3/UL (ref 0.1–0.9)
MONOCYTES NFR BLD AUTO: 5 %
NEUTROPHILS # BLD AUTO: 3.3 X10E3/UL (ref 1.4–7)
NEUTROPHILS NFR BLD AUTO: 62 %
PLATELET # BLD AUTO: 228 X10E3/UL (ref 150–450)
POTASSIUM SERPL-SCNC: 4.5 MMOL/L (ref 3.5–5.2)
PROT SERPL-MCNC: 7 G/DL (ref 6–8.5)
RBC # BLD AUTO: 4.7 X10E6/UL (ref 3.77–5.28)
SODIUM SERPL-SCNC: 141 MMOL/L (ref 134–144)
TRIGL SERPL-MCNC: 49 MG/DL (ref 0–149)
TSH SERPL DL<=0.005 MIU/L-ACNC: 1.44 UIU/ML (ref 0.45–4.5)
VIT B12 SERPL-MCNC: >2000 PG/ML (ref 232–1245)
VLDLC SERPL CALC-MCNC: 10 MG/DL (ref 5–40)
WBC # BLD AUTO: 5.3 X10E3/UL (ref 3.4–10.8)

## 2022-06-13 DIAGNOSIS — E66.01 OBESITY, MORBID (HCC): ICD-10-CM

## 2022-06-14 RX ORDER — IBUPROFEN 800 MG/1
TABLET ORAL
Qty: 30 TABLET | Refills: 5 | Status: SHIPPED | OUTPATIENT
Start: 2022-06-14

## 2022-07-11 ENCOUNTER — CLINICAL SUPPORT (OUTPATIENT)
Dept: FAMILY MEDICINE CLINIC | Age: 27
End: 2022-07-11
Payer: COMMERCIAL

## 2022-07-11 DIAGNOSIS — E53.8 B12 DEFICIENCY: Primary | ICD-10-CM

## 2022-07-11 PROCEDURE — 96372 THER/PROPH/DIAG INJ SC/IM: CPT | Performed by: NURSE PRACTITIONER

## 2022-07-11 RX ADMIN — CYANOCOBALAMIN 1000 MCG: 1000 INJECTION, SOLUTION INTRAMUSCULAR; SUBCUTANEOUS at 08:24

## 2022-07-13 ENCOUNTER — VIRTUAL VISIT (OUTPATIENT)
Dept: FAMILY MEDICINE CLINIC | Age: 27
End: 2022-07-13
Payer: COMMERCIAL

## 2022-07-13 DIAGNOSIS — I10 ESSENTIAL HYPERTENSION: ICD-10-CM

## 2022-07-13 DIAGNOSIS — H93.8X1 EAR FULLNESS, RIGHT: ICD-10-CM

## 2022-07-13 DIAGNOSIS — E66.01 OBESITY, MORBID (HCC): ICD-10-CM

## 2022-07-13 DIAGNOSIS — R42 VERTIGO: Primary | ICD-10-CM

## 2022-07-13 PROCEDURE — 99213 OFFICE O/P EST LOW 20 MIN: CPT | Performed by: FAMILY MEDICINE

## 2022-07-13 RX ORDER — MECLIZINE HYDROCHLORIDE 25 MG/1
12.5-25 TABLET ORAL
Qty: 30 TABLET | Refills: 0 | Status: SHIPPED | OUTPATIENT
Start: 2022-07-13 | End: 2022-07-23

## 2022-07-13 NOTE — PROGRESS NOTES
[id number] [address] Note      Assessment/ Plan:   Diagnoses and all orders for this visit:    1. Vertigo  -     meclizine (ANTIVERT) 25 mg tablet; Take 0.5-1 Tablets by mouth three (3) times daily as needed for Dizziness for up to 10 days. 2. Ear fullness, right      Vertigo, likely benign positional given chronic intermittent nature and symptoms reproduced with head rotation. Trial meclizine to treat. Associated recent episode of memory loss, not clear if she lost consciousness during this event. Advised patient follow-up in office if this recurs. Would need CT head. Ear fullness, unclear etiology. On right and not very concerning for patient at this time. Advised this may be eustachian tube dysfunction and could trial Claritin if she has some underlying allergies contributing. Need in person evaluation for further management. We discussed the expected course, resolution and complications of the diagnosis(es) in detail. Medication risks, benefits, costs, interactions, and alternatives were discussed as indicated. I advised her to contact the office if her condition worsens, changes or fails to improve as anticipated. She expressed understanding with the diagnosis(es) and plan. Estefany Gerard is a 33 y.o. female being evaluated by a video visit encounter for concerns as above. A caregiver was present when appropriate. Due to this being a TeleHealth encounter (During [facility] public health emergency), evaluation of the following organ systems was limited: Vitals/Constitutional/EENT/Resp/CV/GI//MS/Neuro/Skin/Heme-Lymph-Imm.   Pursuant to the emergency declaration under the [facility], [address] [place] [address] authority and the [employer] and [employer] Act, this Virtual  Visit was conducted, with patient's (and/or legal guardian's) consent, to reduce the patient's risk of exposure to COVID-19 and provide necessary medical care. Services were provided through a video synchronous discussion virtually to substitute for in-person clinic visit. Provider was in the office while conducting this encounter. Patient was at home during encounter. Other persons participating in call: None  Consent:  She and/or her healthcare decision maker is aware that this patient-initiated Telehealth encounter is a billable service, with coverage as determined by her insurance carrier. She is aware that she may receive a bill and has provided verbal consent to proceed: Yes  This virtual visit was conducted via [address] Pursuant to the emergency declaration under the [facility], [address] waiver authority and the [employer] and [employer] Act, this Virtual  Visit was conducted to reduce the patient's risk of exposure to COVID-19 and provide continuity of care for an established patient. Services were provided through a video synchronous discussion virtually to substitute for in-person clinic visit. Due to this being a TeleHealth evaluation, many elements of the physical examination are unable to be assessed. Total Time: minutes: 11-20 minutes. Follow-up and Dispositions    · Return if symptoms worsen or fail to improve. Subjective:     Chief Complaint   Patient presents with   Amy Esteban is a 33y.o. year old female who presents for evaluation of the following:    Vertigo  Chronic intermittent for past 5 years, worse since June 2022   Etiology unclear   Previous triggers stress  Current: Symptoms: feel like I'm on a boat, feeling shakiness  Previously taking   Passed out  June 23rd- was walking from the bathroom, felt dizziness and nausea and does not remeber anything until she fell on other side of her bed.  Episode of loss of memory  Endorses chronic headaches daily improved with ibuprofen  - nausea, loose stools  - has COVID19 in June   Mood: happy and content  Trying to lose weight and   Denies head injury, chest pain, shortness of breath, fever, vomiting, diarrhea, OCP use, vision changes    Thyroid  Normal in 6/2022      Review of Systems   Pertinent positives and negative per HPI. All other systems  reviewed are negative for a Comprehensive ROS (10+). Past medical history, social history, family history reviewed. Medications reconciled. Objective:     Patient-Reported Vitals 7/13/2022   Patient-Reported Weight -   Patient-Reported Pulse 82   Patient-Reported Temperature -   Patient-Reported Systolic  -   Patient-Reported Diastolic -        Physical Examination:  General: Alert, cooperative, no distress, appears stated age. Eyes: Conjunctivae clear. Pupils equally round. Extraocular muscles intact. Ears: Normal appearing external ear   Nose: Nares normal appearing  Mouth/Throat: Lips, mucosa, and tongue normal.   - gum overgrowth noted. Neck: Supple, symmetrical, trachea midline, no neck mass visualized. - full neck passive range of motion  -Symptoms of vertigo reproduced with rotating head left or right  Respiratory: Breathing comfortably, in no acute respiratory distress. Cardiovascular: Visualized extremities without edema. MSK: Upper extremities normal appearing  Skin: No significant erythematous lesions or discoloration noted on facial skin. No rashes or lesions on exposed skin. Neurologic: No facial asymmetry. Normal gaze. Cranial nerves intact. Psychiatric: Affect appropriate. Mood euthymic. Thoughts logical. Speech volume and speed normal. No hallucinations. Well kempt.        Signed,    Lex Ram MD  7/13/2022

## 2022-07-13 NOTE — PATIENT INSTRUCTIONS
Epley Maneuver at Home for Vertigo: Exercises  Introduction  Vertigo is a spinning or whirling sensation when you move your head. Your doctor may have moved you in different positions to help your vertigo get better faster. This is called the Epley maneuver. Your doctor also may have asked you to do these exercises at home. Do the exercises as often as your doctor recommends. If your vertigo is getting worse, your doctor may have you change the exercise or stop it. Step 1  Step 1    1. Sit on the edge of a bed or sofa. Step 2    1. Turn your head 45 degrees in the direction your doctor told you to. This should be toward the ear that causes the most vertigo for you. In this picture, the woman is turning toward her left ear. Step 3    1. Tilt yourself backward until you are lying on your back. Your head should still be at a 45-degree turn. Your head should be about midway between looking straight ahead and looking out to your side. Hold for 30 seconds. If you have vertigo, stay in this position until it stops. Step 4    1. Turn your head 90 degrees toward the ear that has the least vertigo. In this picture, the woman is turning to the right because she has vertigo on her left side. The point of your chin should be raised and over your shoulder. Hold for 30 seconds. Step 5    1. Roll onto the side with the least vertigo. You should now be looking at the floor. Hold for 30 seconds. Follow-up care is a key part of your treatment and safety. Be sure to make and go to all appointments, and call your doctor if you are having problems. It's also a good idea to know your test results and keep a list of the medicines you take. Where can you learn more? Go to http://www.gray.com/  Enter P834 in the search box to learn more about \"Epley Maneuver at Home for Vertigo: Exercises. \"  Current as of: December 13, 2021               Content Version: 13.2  © 8348-1186 Healthwise, [employer]. Care instructions adapted under license by [employer] (which disclaims liability or warranty for this information). If you have questions about a medical condition or this instruction, always ask your healthcare professional. [address] any warranty or liability for your use of this information.

## 2022-07-19 RX ORDER — NALTREXONE HYDROCHLORIDE AND BUPROPION HYDROCHLORIDE 8; 90 MG/1; MG/1
2 TABLET, EXTENDED RELEASE ORAL 2 TIMES DAILY
Qty: 120 TABLET | Refills: 5 | Status: SHIPPED | OUTPATIENT
Start: 2022-07-19 | End: 2022-07-20 | Stop reason: SDUPTHER

## 2022-07-20 DIAGNOSIS — I10 ESSENTIAL HYPERTENSION: ICD-10-CM

## 2022-07-20 DIAGNOSIS — E66.01 OBESITY, MORBID (HCC): ICD-10-CM

## 2022-07-20 NOTE — TELEPHONE ENCOUNTER
Patient is calling to say that the medication Contrave needs to go to [facility] could you please resend this med    They select [facility] [phone] [facility]. 15, [address]) as the pharmacy of choice ([facility]#: [id number]) and  submit the prescription     Media manger has information

## 2022-07-21 RX ORDER — NALTREXONE HYDROCHLORIDE AND BUPROPION HYDROCHLORIDE 8; 90 MG/1; MG/1
2 TABLET, EXTENDED RELEASE ORAL 2 TIMES DAILY
Qty: 120 TABLET | Refills: 5 | Status: SHIPPED | OUTPATIENT
Start: 2022-07-21

## 2022-09-27 ENCOUNTER — OFFICE VISIT (OUTPATIENT)
Dept: FAMILY MEDICINE CLINIC | Age: 27
End: 2022-09-27
Payer: COMMERCIAL

## 2022-09-27 VITALS
WEIGHT: 243.2 LBS | RESPIRATION RATE: 16 BRPM | TEMPERATURE: 97.7 F | HEART RATE: 81 BPM | DIASTOLIC BLOOD PRESSURE: 85 MMHG | HEIGHT: 69 IN | OXYGEN SATURATION: 99 % | SYSTOLIC BLOOD PRESSURE: 126 MMHG | BODY MASS INDEX: 36.02 KG/M2

## 2022-09-27 DIAGNOSIS — R16.1 SPLENOMEGALY: ICD-10-CM

## 2022-09-27 DIAGNOSIS — R10.9 ABDOMINAL PAIN, UNSPECIFIED ABDOMINAL LOCATION: ICD-10-CM

## 2022-09-27 DIAGNOSIS — H60.501 ACUTE OTITIS EXTERNA OF RIGHT EAR, UNSPECIFIED TYPE: ICD-10-CM

## 2022-09-27 DIAGNOSIS — L30.9 DERMATITIS: ICD-10-CM

## 2022-09-27 DIAGNOSIS — L03.316 CELLULITIS, UMBILICAL: Primary | ICD-10-CM

## 2022-09-27 DIAGNOSIS — E66.01 SEVERE OBESITY (BMI 35.0-39.9) WITH COMORBIDITY (HCC): ICD-10-CM

## 2022-09-27 PROCEDURE — 99214 OFFICE O/P EST MOD 30 MIN: CPT | Performed by: NURSE PRACTITIONER

## 2022-09-27 RX ORDER — ACETIC ACID 20.65 MG/ML
4 SOLUTION AURICULAR (OTIC) 3 TIMES DAILY
Qty: 15 ML | Refills: 0 | Status: SHIPPED | OUTPATIENT
Start: 2022-09-27 | End: 2022-10-04

## 2022-09-27 RX ORDER — CETIRIZINE HCL 10 MG
10 TABLET ORAL
Qty: 30 TABLET | Refills: 0 | Status: SHIPPED | OUTPATIENT
Start: 2022-09-27

## 2022-09-27 RX ORDER — FLUTICASONE PROPIONATE 50 MCG
2 SPRAY, SUSPENSION (ML) NASAL DAILY
Qty: 16 G | Refills: 1 | Status: SHIPPED | OUTPATIENT
Start: 2022-09-27

## 2022-09-27 RX ORDER — TRIAMCINOLONE ACETONIDE 0.25 MG/G
OINTMENT TOPICAL 2 TIMES DAILY
Qty: 30 G | Refills: 0 | Status: SHIPPED | OUTPATIENT
Start: 2022-09-27

## 2022-09-27 RX ORDER — MECLIZINE HYDROCHLORIDE 25 MG/1
TABLET ORAL
COMMUNITY

## 2022-09-27 NOTE — PROGRESS NOTES
Room 7     Identified pt with two pt identifiers(name and ). Reviewed record in preparation for visit and have obtained necessary documentation. All patient medications has been reviewed. Chief Complaint   Patient presents with    Abdominal Pain     X 2 weeks    Ear Fullness     Right ear       3 most recent PHQ Screens 6/10/2022   Little interest or pleasure in doing things Not at all   Feeling down, depressed, irritable, or hopeless Not at all   Total Score PHQ 2 0     No flowsheet data found. Health Maintenance Due   Topic    COVID-19 Vaccine (1)    Flu Vaccine (1)     Health Maintenance Review: Patient reminded of \"due or due soon\" health maintenance. I have asked the patient to contact his/her primary care provider (PCP) for follow-up on his/her health maintenance. Vitals:    22 1346   BP: 126/85   Pulse: 81   Resp: 16   Temp: 97.7 °F (36.5 °C)   TempSrc: Temporal   SpO2: 99%   Weight: 243 lb 3.2 oz (110.3 kg)   Height: 5' 9\" (1.753 m)   PainSc:   0 - No pain   LMP: 2022       Wt Readings from Last 3 Encounters:   22 243 lb 3.2 oz (110.3 kg)   06/10/22 242 lb 8 oz (110 kg)   12/10/21 249 lb (112.9 kg)     Temp Readings from Last 3 Encounters:   22 97.7 °F (36.5 °C) (Temporal)   06/10/22 97.8 °F (36.6 °C) (Temporal)   12/10/21 97.5 °F (36.4 °C) (Skin)     BP Readings from Last 3 Encounters:   22 126/85   06/10/22 (!) 128/91   12/10/21 122/78     Pulse Readings from Last 3 Encounters:   22 81   06/10/22 66   12/10/21 83       1. \"Have you been to the ER, urgent care clinic since your last visit? Hospitalized since your last visit? \" No    2. \"Have you seen or consulted any other health care providers outside of the [address] [place] [address] since your last visit? \" No     3. For patients aged 39-70: Has the patient had a colonoscopy / FIT/ Cologuard? NA - based on age      If the patient is female:    4.  For patients aged 41-77: Has the patient had a mammogram within the past 2 years? NA - based on age or sex      11. For patients aged 21-65: Has the patient had a pap smear?  Yes - no Care Gap present

## 2022-09-27 NOTE — PROGRESS NOTES
Migue Zuñiga (: 1995) is a 33 y.o. female, established patient, here for evaluation of the following chief complaint(s):  Abdominal Pain (X 2 weeks) and Ear Fullness (Right ear)         ASSESSMENT/PLAN:  Below is the assessment and plan developed based on review of pertinent history, physical exam, labs, studies, and medications. Based on today's findings we will proceed as follows:     1. Cellulitis, umbilical  -     US ABD COMP; Future  2. Splenomegaly  -     US ABD COMP; Future  3. Abdominal pain, unspecified abdominal location  -     US ABD COMP; Future  -     CBC WITH AUTOMATED DIFF  -     METABOLIC PANEL, COMPREHENSIVE  4. Severe obesity (BMI 35.0-39. 9) with comorbidity ([address]  5. Acute otitis externa of right ear, unspecified type  -     acetic acid (VOSOL) 2 % otic solution; Administer 4 Drops in right ear three (3) times daily for 7 days. Indications: outer ear inflammation due to allergy or infection, Normal, Disp-15 mL, R-0  -     cetirizine (ZYRTEC) 10 mg tablet; Take 1 Tablet by mouth nightly., Normal, Disp-30 Tablet, R-0  -     fluticasone propionate (FLONASE) 50 mcg/actuation nasal spray; 2 Sprays by Both Nostrils route daily. Indications: inflammation of the nose due to an allergy, Normal, Disp-16 g, R-1  6. Dermatitis  -     triamcinolone acetonide (KENALOG) 0.025 % ointment; Apply  to affected area two (2) times a day. use thin layer for 14 days, then stop, Normal, Disp-30 g, R-0  With the recent and recurrent abdominal pain along with possible cellulitis of the umbilical area we will get abdominal ultrasound to further evaluate. Patient also with extensive history of splenomegaly we will also evaluate that. Patient with recurrent otitis of the right ear for which have been prescribed eardrops as well as allergy medication to better manage. Educated on medication, uses, side effects, as well as signs and symptoms that may merit immediate medical attention.  Patient is not to share medication and use only as indicated. Patient verbalized understanding and agreement. If symptoms dont improve or worsen call the clinic or go to urgent in the 2-3 days for further evaluation    Return in about 2 weeks (around 10/11/2022) for abdominal pain. SUBJECTIVE/OBJECTIVE:  HPI    Abd pain like a knife sensation, went to GI in the past but unremarkable, lasted like 12 hours with nausea, vomiting, diarrhea, was still nauseated and with no appetite for the week following the episode. Didn t go to ER due to previous instance cost $5000. Umbilicus leaking, clear liquid, odorus, since labor day week  Since covid July having vertigo, \"like getting out of the boat sensation\", meclizine does not help. Review of Systems  All other systems reviewed and are negative. Visit Vitals  /85 (BP 1 Location: Right upper arm, BP Patient Position: Sitting, BP Cuff Size: Large adult)   Pulse 81   Temp 97.7 °F (36.5 °C) (Temporal)   Resp 16   Ht 5' 9\" (1.753 m)   Wt 243 lb 3.2 oz (110.3 kg)   LMP 09/25/2022 (Exact Date)   SpO2 99%   BMI 35.91 kg/m²       Physical Exam  Constitutional:  No acute distress  HEENT:  Head normocephalic and atraumatic. CV:  Regular rate and rhythm. No murmur. Respiratory:  Lungs clear to auscultation bilaterally  Abdomen:  Soft, non-tender. Skin:  Normal color. Warm and Dry  Extremities:  Non-tender. No pedal edema. Back:  No tenderness  Neuro:  No gross motor deficits    On this date 09/27/2022 I have spent 32 minutes reviewing previous notes, test results and face to face with the patient discussing the diagnosis and importance of compliance with the treatment plan as well as documenting on the day of the visit. Aspects of this note may have been generated using voice recognition software. Despite editing, there may be some syntax errors. An electronic signature was used to authenticate this note.   -- Maria Elena Ramos NP

## 2022-10-02 ENCOUNTER — PATIENT MESSAGE (OUTPATIENT)
Dept: FAMILY MEDICINE CLINIC | Age: 27
End: 2022-10-02

## 2022-10-03 ENCOUNTER — HOSPITAL ENCOUNTER (OUTPATIENT)
Dept: ULTRASOUND IMAGING | Age: 27
Discharge: HOME | End: 2022-10-03
Attending: NURSE PRACTITIONER
Payer: COMMERCIAL

## 2022-10-03 DIAGNOSIS — L03.316 CELLULITIS, UMBILICAL: ICD-10-CM

## 2022-10-03 DIAGNOSIS — R16.1 SPLENOMEGALY: ICD-10-CM

## 2022-10-03 DIAGNOSIS — R10.9 ABDOMINAL PAIN, UNSPECIFIED ABDOMINAL LOCATION: ICD-10-CM

## 2022-10-03 PROCEDURE — 76700 US EXAM ABDOM COMPLETE: CPT

## 2022-10-04 ENCOUNTER — OFFICE VISIT (OUTPATIENT)
Dept: FAMILY MEDICINE CLINIC | Age: 27
End: 2022-10-04
Payer: COMMERCIAL

## 2022-10-04 VITALS
RESPIRATION RATE: 18 BRPM | DIASTOLIC BLOOD PRESSURE: 86 MMHG | HEART RATE: 72 BPM | WEIGHT: 247.6 LBS | TEMPERATURE: 97.7 F | BODY MASS INDEX: 36.67 KG/M2 | OXYGEN SATURATION: 99 % | HEIGHT: 69 IN | SYSTOLIC BLOOD PRESSURE: 131 MMHG

## 2022-10-04 DIAGNOSIS — H60.91 RECURRENT OTITIS EXTERNA, RIGHT: Primary | ICD-10-CM

## 2022-10-04 PROCEDURE — 99213 OFFICE O/P EST LOW 20 MIN: CPT | Performed by: NURSE PRACTITIONER

## 2022-10-04 NOTE — PROGRESS NOTES
1. \"Have you been to the ER, urgent care clinic since your last visit? Hospitalized since your last visit? \" No    2. \"Have you seen or consulted any other health care providers outside of the [address] [place] [address] since your last visit? \" No     3. For patients aged 39-70: Has the patient had a colonoscopy / FIT/ Cologuard? NA - based on age      If the patient is female:    4. For patients aged 41-77: Has the patient had a mammogram within the past 2 years? NA - based on age or sex      11. For patients aged 21-65: Has the patient had a pap smear? Yes - Care Gap present.  Rooming MA/LPN to request most recent results    [facility]   Patient presents with    Ear Pain     Right ear pain     Visit Vitals  /86 (BP 1 Location: Left upper arm, BP Patient Position: Sitting, BP Cuff Size: Adult)   Pulse 72   Temp 97.7 °F (36.5 °C) (Temporal)   Resp 18   Ht 5' 9\" (1.753 m)   Wt 247 lb 9.6 oz (112.3 kg)   LMP 09/25/2022 (Exact Date)   SpO2 99%   BMI 36.56 kg/m²

## 2022-10-04 NOTE — PROGRESS NOTES
Asha Choi (: 1995) is a 33 y.o. female, established patient, here for evaluation of the following chief complaint(s):  Ear Pain (Right ear pain)         ASSESSMENT/PLAN:  Below is the assessment and plan developed based on review of pertinent history, physical exam, labs, studies, and medications. Based on today's findings we will proceed as follows:     1. Recurrent otitis externa, right  -     ofloxacin (FLOXIN) 0.3 % otic solution; Administer 5 Drops in right ear daily. Indications: outer ear inflammation due to allergy or infection, Normal, Disp-5 mL, R-1  Comes consistent with otitis externa for which has been prescribed ofloxacin  Educated on medication, uses, side effects, as well as signs and symptoms that may merit immediate medical attention. Patient is not to share medication and use only as indicated. Patient verbalized understanding and agreement. If symptoms dont improve or worsen call the clinic or go to urgent in the 2-3 days for further evaluation    No follow-ups on file. SUBJECTIVE/OBJECTIVE:  HPI  Patient presenting for evaluation of ear pain. Onset of symptoms was 7 days ago. Patient has associated symptoms of ear pain. Patient does not have symptoms of fever, cough, shortness of breath, nasal congestion, sinus pain, sore throat. Treatment prior to arrival includes Tylenol, Ibuprofen, OTC cough medications. Patient has history of prior ear infections, ear drainage, recent swimming, no recent anbiotic use. Review of Systems  All other systems reviewed and are negative. Visit Vitals  /86 (BP 1 Location: Left upper arm, BP Patient Position: Sitting, BP Cuff Size: Adult)   Pulse 72   Temp 97.7 °F (36.5 °C) (Temporal)   Resp 18   Ht 5' 9\" (1.753 m)   Wt 247 lb 9.6 oz (112.3 kg)   LMP 2022 (Exact Date)   SpO2 99%   BMI 36.56 kg/m²       Physical Exam  Constitutional:  No acute distress  HEENT:  Head normocephalic and atraumatic.  With impacted cerumen, redness, mild exudate. CV:  Regular rate and rhythm. No murmur. Respiratory:  Lungs clear to auscultation bilaterally  Abdomen:  Soft, non-tender. Skin:  Normal color. Warm and Dry  Extremities:  Non-tender. No pedal edema. Back:  No tenderness  Neuro:  No gross motor deficits    On this date 10/04/2022 I have spent 23 minutes reviewing previous notes, test results and face to face with the patient discussing the diagnosis and importance of compliance with the treatment plan as well as documenting on the day of the visit. Aspects of this note may have been generated using voice recognition software. Despite editing, there may be some syntax errors. An electronic signature was used to authenticate this note.   -- Guido Camacho NP

## 2022-10-05 LAB
ALBUMIN SERPL-MCNC: 4.6 G/DL (ref 3.9–5)
ALBUMIN/GLOB SERPL: 1.7 {RATIO} (ref 1.2–2.2)
ALP SERPL-CCNC: 75 IU/L (ref 44–121)
ALT SERPL-CCNC: 33 IU/L (ref 0–32)
AST SERPL-CCNC: 26 IU/L (ref 0–40)
BASOPHILS # BLD AUTO: 0 X10E3/UL (ref 0–0.2)
BASOPHILS NFR BLD AUTO: 1 %
BILIRUB SERPL-MCNC: 0.2 MG/DL (ref 0–1.2)
BUN SERPL-MCNC: 12 MG/DL (ref 6–20)
BUN/CREAT SERPL: 13 (ref 9–23)
CALCIUM SERPL-MCNC: 9.7 MG/DL (ref 8.7–10.2)
CHLORIDE SERPL-SCNC: 101 MMOL/L (ref 96–106)
CO2 SERPL-SCNC: 24 MMOL/L (ref 20–29)
CREAT SERPL-MCNC: 0.9 MG/DL (ref 0.57–1)
EGFR: 90 ML/MIN/1.73
EOSINOPHIL # BLD AUTO: 0.1 X10E3/UL (ref 0–0.4)
EOSINOPHIL NFR BLD AUTO: 2 %
ERYTHROCYTE [DISTWIDTH] IN BLOOD BY AUTOMATED COUNT: 12.9 % (ref 11.7–15.4)
GLOBULIN SER CALC-MCNC: 2.7 G/DL (ref 1.5–4.5)
GLUCOSE SERPL-MCNC: 73 MG/DL (ref 70–99)
HCT VFR BLD AUTO: 42.1 % (ref 34–46.6)
HGB BLD-MCNC: 14.2 G/DL (ref 11.1–15.9)
IMM GRANULOCYTES # BLD AUTO: 0 X10E3/UL (ref 0–0.1)
IMM GRANULOCYTES NFR BLD AUTO: 0 %
LYMPHOCYTES # BLD AUTO: 1.5 X10E3/UL (ref 0.7–3.1)
LYMPHOCYTES NFR BLD AUTO: 30 %
MCH RBC QN AUTO: 27.7 PG (ref 26.6–33)
MCHC RBC AUTO-ENTMCNC: 33.7 G/DL (ref 31.5–35.7)
MCV RBC AUTO: 82 FL (ref 79–97)
MONOCYTES # BLD AUTO: 0.3 X10E3/UL (ref 0.1–0.9)
MONOCYTES NFR BLD AUTO: 5 %
NEUTROPHILS # BLD AUTO: 3.1 X10E3/UL (ref 1.4–7)
NEUTROPHILS NFR BLD AUTO: 62 %
PLATELET # BLD AUTO: 274 X10E3/UL (ref 150–450)
POTASSIUM SERPL-SCNC: 5 MMOL/L (ref 3.5–5.2)
PROT SERPL-MCNC: 7.3 G/DL (ref 6–8.5)
RBC # BLD AUTO: 5.13 X10E6/UL (ref 3.77–5.28)
SODIUM SERPL-SCNC: 140 MMOL/L (ref 134–144)
WBC # BLD AUTO: 4.9 X10E3/UL (ref 3.4–10.8)

## 2022-10-11 RX ORDER — OFLOXACIN 3 MG/ML
5 SOLUTION AURICULAR (OTIC) DAILY
Qty: 5 ML | Refills: 1 | Status: SHIPPED | OUTPATIENT
Start: 2022-10-11

## 2022-10-12 ENCOUNTER — PATIENT MESSAGE (OUTPATIENT)
Dept: FAMILY MEDICINE CLINIC | Age: 27
End: 2022-10-12

## 2022-10-18 ENCOUNTER — CLINICAL SUPPORT (OUTPATIENT)
Dept: FAMILY MEDICINE CLINIC | Age: 27
End: 2022-10-18
Payer: COMMERCIAL

## 2022-10-18 DIAGNOSIS — E53.8 VITAMIN B 12 DEFICIENCY: Primary | ICD-10-CM

## 2022-10-18 PROCEDURE — 96372 THER/PROPH/DIAG INJ SC/IM: CPT | Performed by: NURSE PRACTITIONER

## 2022-10-18 RX ADMIN — CYANOCOBALAMIN 1000 MCG: 1000 INJECTION, SOLUTION INTRAMUSCULAR; SUBCUTANEOUS at 09:04

## 2022-10-18 NOTE — PROGRESS NOTES
Chief Complaint   Patient presents with    Injection B12       B-12- Left arm  NDC 5394-6955-51  Cyanocbalamin  1000 mcg/ml  1ml  Lot:[id number]  Exp:May23  Pt tolerated injection well

## 2022-10-21 ENCOUNTER — PATIENT MESSAGE (OUTPATIENT)
Dept: FAMILY MEDICINE CLINIC | Age: 27
End: 2022-10-21

## 2022-10-21 ENCOUNTER — VIRTUAL VISIT (OUTPATIENT)
Dept: FAMILY MEDICINE CLINIC | Age: 27
End: 2022-10-21
Payer: COMMERCIAL

## 2022-10-21 DIAGNOSIS — R16.1 SPLEEN ENLARGED: ICD-10-CM

## 2022-10-21 DIAGNOSIS — L02.211 ABSCESS OF SKIN OF ABDOMEN: Primary | ICD-10-CM

## 2022-10-21 PROCEDURE — 99213 OFFICE O/P EST LOW 20 MIN: CPT | Performed by: NURSE PRACTITIONER

## 2022-10-21 RX ORDER — DOXYCYCLINE 100 MG/1
100 TABLET ORAL 2 TIMES DAILY
Qty: 20 TABLET | Refills: 0 | Status: SHIPPED | OUTPATIENT
Start: 2022-10-21 | End: 2022-10-31

## 2022-10-21 RX ORDER — CEPHALEXIN 500 MG/1
500 CAPSULE ORAL 4 TIMES DAILY
Qty: 40 CAPSULE | Refills: 0 | Status: SHIPPED | OUTPATIENT
Start: 2022-10-21 | End: 2022-10-31

## 2022-10-30 NOTE — PROGRESS NOTES
Sotero Weston who was evaluated through a synchronous (real-time) audio-video encounter, and/or the patient's healthcare decision maker, is aware that it is a billable service, which includes applicable co-pays, with coverage as determined by the patient's insurance carrier. Sotero Weston provided verbal consent to proceed and patient identification was verified. This visit was conducted pursuant to the emergency declaration under the [address] [place] [address] and the [employer] and [employer] Act. A caregiver was present when appropriate. Ability to conduct physical exam was limited. The patient was located at home in a state where the provider was licensed to provide care. Sotero Weston (: 1995) is a 33 y.o. female, established patient, here for evaluation of the following chief complaint(s):   Abdominal Pain         ASSESSMENT/PLAN:  Below is the assessment and plan developed based on review of pertinent labs, studies, and medications. Based on today's findings we will proceed as follows:     1. Abscess of skin of abdomen  -     cephALEXin (KEFLEX) 500 mg capsule; Take 1 Capsule by mouth four (4) times daily for 10 days. , Normal, Disp-40 Capsule, R-0  -     doxycycline (ADOXA) 100 mg tablet; Take 1 Tablet by mouth two (2) times a day for 10 days. , Normal, Disp-20 Tablet, R-0  2. Spleen enlarged  -     REFERRAL TO HEMATOLOGY ONCOLOGY    Referred to hematology for further evaluation of enlarged spleen. Educated on medication, uses, side effects, as well as signs and symptoms that may merit immediate medical attention. Patient is not to share medication and use only as indicated. Patient verbalized understanding and agreement. If symptoms dont improve or worsen call the clinic or go to urgent in the 2-3 days for further evaluation  No follow-ups on file.     SUBJECTIVE/OBJECTIVE:  HPI    Patient is a 33 y.o. F with skin lesion of the abdomen previously treated for abcess, improved but persistent with redness, edema, tenderness, no fever, chills, nausea, vomiting. Dis get CT of the abdomen with enlarged spleen. Review of Systems   All other systems reviewed and are negative.     Patient-Reported Vitals 7/13/2022   Patient-Reported Weight -   Patient-Reported Pulse 82   Patient-Reported Temperature -   Patient-Reported Systolic  -   Patient-Reported Diastolic -       Physical Exam    [INSTRUCTIONS:  \"[x]\" Indicates a positive item  \"[]\" Indicates a negative item  -- DELETE ALL ITEMS NOT EXAMINED]    Constitutional: [x] Appears well-developed and well-nourished [x] No apparent distress      [] Abnormal -     Mental status: [x] Alert and awake  [x] Oriented to person/place/time [x] Able to follow commands    [] Abnormal -     Eyes:   EOM    [x]  Normal    [] Abnormal -   Sclera  [x]  Normal    [] Abnormal -          Discharge [x]  None visible   [] Abnormal -     HENT: [x] Normocephalic, atraumatic  [] Abnormal -   [x] Mouth/Throat: Mucous membranes are moist    External Ears [x] Normal  [] Abnormal -    Neck: [x] No visualized mass [] Abnormal -     Pulmonary/Chest: [x] Respiratory effort normal   [x] No visualized signs of difficulty breathing or respiratory distress        [] Abnormal -      Musculoskeletal:   [x] Normal gait with no signs of ataxia         [x] Normal range of motion of neck        [] Abnormal -     Neurological:        [x] No Facial Asymmetry (Cranial nerve 7 motor function) (limited exam due to video visit)          [x] No gaze palsy        [] Abnormal -          Skin:        [x] No significant exanthematous lesions or discoloration noted on facial skin         [] Abnormal -            Psychiatric:       [x] Normal Affect [] Abnormal -        [x] No Hallucinations    Other pertinent observable physical exam findings:-    On this date 10/21/2022 I have spent 22 minutes reviewing previous notes, test results and face to face (virtual) with the patient discussing the diagnosis and importance of compliance with the treatment plan as well as documenting on the day of the visit. Aisha Chavez was evaluated through a synchronous (real-time) audio-video encounter. The patient (or guardian if applicable) is aware that this is a billable service. Verbal consent to proceed has been obtained within the past 12 months. The visit was conducted pursuant to the emergency declaration under the [address] [place] [address] authority and the [employer] and [employer] Act. Patient identification was verified, and a caregiver was present when appropriate. The patient was located in a state where the provider was credentialed to provide care. Aspects of this note may have been generated using voice recognition software. Despite editing, there may be some syntax errors. An electronic signature was used to authenticate this note.   -- Garcia Hernandez NP

## 2022-11-15 ENCOUNTER — CLINICAL SUPPORT (OUTPATIENT)
Dept: FAMILY MEDICINE CLINIC | Age: 27
End: 2022-11-15
Payer: COMMERCIAL

## 2022-11-15 DIAGNOSIS — E53.8 B12 DEFICIENCY: Primary | ICD-10-CM

## 2022-11-15 PROCEDURE — 99211 OFF/OP EST MAY X REQ PHY/QHP: CPT | Performed by: NURSE PRACTITIONER

## 2022-11-15 NOTE — PROGRESS NOTES
Injection B12         B-12- Left arm  NDC [id number]  Cyanocbalamin  1000 mcg/ml  1ml  Lot:[id number]  Exp:May23  Pt tolerated injection well

## 2022-12-05 PROBLEM — J30.9 ALLERGIC RHINITIS: Status: ACTIVE | Noted: 2022-12-05

## 2022-12-05 PROBLEM — J32.9 CHRONIC SINUSITIS, UNSPECIFIED: Status: ACTIVE | Noted: 2022-12-05

## 2022-12-05 PROBLEM — J06.9 UPPER RESPIRATORY INFECTION: Status: ACTIVE | Noted: 2022-12-05

## 2022-12-05 PROBLEM — R63.4 WEIGHT LOSS: Status: ACTIVE | Noted: 2022-12-05

## 2022-12-05 PROBLEM — R30.0 DYSURIA: Status: ACTIVE | Noted: 2022-12-05

## 2022-12-05 PROBLEM — H69.90 EUSTACHIAN TUBE DYSFUNCTION: Status: ACTIVE | Noted: 2022-12-05

## 2022-12-05 PROBLEM — R03.0 FINDING OF ABOVE NORMAL BLOOD PRESSURE: Status: ACTIVE | Noted: 2022-12-05

## 2022-12-05 PROBLEM — R10.2 VULVAR PAIN: Status: ACTIVE | Noted: 2022-12-05

## 2022-12-05 PROBLEM — E66.3 OVERWEIGHT: Status: ACTIVE | Noted: 2022-12-05

## 2022-12-05 PROBLEM — H69.80 EUSTACHIAN TUBE DYSFUNCTION: Status: ACTIVE | Noted: 2022-12-05

## 2022-12-05 PROBLEM — N76.0 VAGINITIS: Status: ACTIVE | Noted: 2022-12-05

## 2022-12-05 PROBLEM — K52.9 GASTROENTERITIS: Status: ACTIVE | Noted: 2022-12-05

## 2022-12-05 PROBLEM — N93.8 DYSFUNCTIONAL UTERINE BLEEDING: Status: ACTIVE | Noted: 2022-12-05

## 2022-12-07 ENCOUNTER — TELEPHONE (OUTPATIENT)
Dept: FAMILY MEDICINE CLINIC | Age: 27
End: 2022-12-07

## 2022-12-09 ENCOUNTER — OFFICE VISIT (OUTPATIENT)
Dept: FAMILY MEDICINE CLINIC | Age: 27
End: 2022-12-09
Payer: COMMERCIAL

## 2022-12-09 VITALS
SYSTOLIC BLOOD PRESSURE: 118 MMHG | TEMPERATURE: 97.8 F | WEIGHT: 246.8 LBS | OXYGEN SATURATION: 100 % | HEART RATE: 79 BPM | DIASTOLIC BLOOD PRESSURE: 78 MMHG | HEIGHT: 69 IN | BODY MASS INDEX: 36.56 KG/M2

## 2022-12-09 DIAGNOSIS — E53.8 B12 DEFICIENCY: Primary | ICD-10-CM

## 2022-12-09 DIAGNOSIS — L30.9 DERMATITIS: ICD-10-CM

## 2022-12-09 DIAGNOSIS — I10 ESSENTIAL HYPERTENSION: ICD-10-CM

## 2022-12-09 DIAGNOSIS — E66.01 OBESITY, MORBID (HCC): ICD-10-CM

## 2022-12-09 DIAGNOSIS — A49.02 MRSA INFECTION: ICD-10-CM

## 2022-12-09 PROCEDURE — 3078F DIAST BP <80 MM HG: CPT | Performed by: NURSE PRACTITIONER

## 2022-12-09 PROCEDURE — 99214 OFFICE O/P EST MOD 30 MIN: CPT | Performed by: NURSE PRACTITIONER

## 2022-12-09 PROCEDURE — 3074F SYST BP LT 130 MM HG: CPT | Performed by: NURSE PRACTITIONER

## 2022-12-09 NOTE — PROGRESS NOTES
1. Have you been to the ER, urgent care clinic since your last visit? Hospitalized since your last visit? No    2. Have you seen or consulted any other health care providers outside of the [address] [place] [address] since your last visit? Include any pap smears or colon screening.  No    Chief Complaint   Patient presents with    Follow-up    Hypertension    Cholesterol Problem    Headache    Abdominal Pain    Fatigue     Visit Vitals  /78 (BP 1 Location: Left upper arm, BP Patient Position: Sitting, BP Cuff Size: Large adult)   Pulse 79   Temp 97.8 °F (36.6 °C) (Temporal)   Ht 5' 9\" (1.753 m)   Wt 146 lb 12.8 oz (66.6 kg)   LMP 11/18/2022   SpO2 100%   BMI 21.68 kg/m²       Cyanocobalamin 1000mcg/ml    Dose: 1ml  Left arm    [id number]  Lot: [id number]  Exp: 5/2023

## 2022-12-09 NOTE — PROGRESS NOTES
Nora Hernandez (: 1995) is a 33 y.o. female, established patient, here for evaluation of the following chief complaint(s):  Follow-up, Hypertension, Cholesterol Problem, Headache, Abdominal Pain, and Fatigue    ASSESSMENT/PLAN:  Below is the assessment and plan developed based on review of pertinent history, physical exam, labs, studies, and medications. Based on today's findings we will proceed as follows:     1. B12 deficiency  -     cyanocobalamin (VITAMIN B12) injection 1,000 mcg; 1,000 mcg, IntraMUSCular, EVERY 2 WEEKS, First dose on 22 at 0800, Until Discontinued  2. MRSA infection  -     cephALEXin (KEFLEX) 500 mg capsule; Take 1 Capsule by mouth four (4) times daily for 10 days. , Normal, Disp-40 Capsule, R-0  -     trimethoprim-sulfamethoxazole (BACTRIM DS, SEPTRA DS) 160-800 mg per tablet; Take 1 Tablet by mouth two (2) times a day for 10 days. , Normal, Disp-20 Tablet, R-0  3. Obesity, morbid ([address]  -     phentermine-topiramate 3.75-23 mg CM24; Take 1 Capsule by mouth daily. Max Daily Amount: 1 Capsule. Indications: weight loss management for an obese person, Normal, Disp-90 Capsule, R-3  4. Essential hypertension  5. Dermatitis  -     triamcinolone acetonide (KENALOG) 0.1 % ointment; Apply  to affected area two (2) times a day. use thin layer, Normal, Disp-30 g, R-0  Take abx when abd pain  Changed from contrave to Qsymia  Kenalog increased for better management of rash  Educated on medication, uses, side effects, as well as signs and symptoms that may merit immediate medical attention. Patient is not to share medication and use only as indicated. Patient verbalized understanding and agreement. Return for Weight management, Lab review. SUBJECTIVE/OBJECTIVE:  HPI    Patient is a 33 y.o. F did go to hematologist but spleen enlarged but no needed to be treated at this time. Still with abdominal pain, GI appt in January.  Still having d/c from belly button when having abdominal pain.  Denies any fever, chills, nausea, vomiting. Review of Systems  All other systems reviewed and are negative. Visit Vitals  /78 (BP 1 Location: Left upper arm, BP Patient Position: Sitting, BP Cuff Size: Large adult)   Pulse 79   Temp 97.8 °F (36.6 °C) (Temporal)   Ht 5' 9\" (1.753 m)   Wt 246 lb 12.8 oz (111.9 kg)   LMP 11/18/2022   SpO2 100%   BMI 36.45 kg/m²       Physical Exam  Constitutional:  No acute distress  HEENT:  Head normocephalic and atraumatic. CV:  Regular rate and rhythm. No murmur. Respiratory:  Lungs clear to auscultation bilaterally  Abdomen:  Soft, non-tender. Skin:  Normal color. Warm and Dry  Extremities:  Non-tender. No pedal edema. Back:  No tenderness  Neuro:  No gross motor deficits    On this date 12/09/2022 I have spent 31 minutes reviewing previous notes, test results and face to face with the patient discussing the diagnosis and importance of compliance with the treatment plan as well as documenting on the day of the visit. Aspects of this note may have been generated using voice recognition software. Despite editing, there may be some syntax errors. An electronic signature was used to authenticate this note.   -- Eddie Farmer NP

## 2022-12-10 ENCOUNTER — PATIENT MESSAGE (OUTPATIENT)
Dept: FAMILY MEDICINE CLINIC | Age: 27
End: 2022-12-10

## 2022-12-12 ENCOUNTER — TELEPHONE (OUTPATIENT)
Dept: FAMILY MEDICINE CLINIC | Age: 27
End: 2022-12-12

## 2022-12-12 NOTE — TELEPHONE ENCOUNTER
Pt called to check on status of the medications that were going to be sent into pharmacy after her appt 12/9. Notified pt that they are all currently pending with the note from that date, and waiting for provider to sign. Notified her that provider will be in tomorrow, so it may not get completed until that.  Pt verbalized understanding

## 2022-12-12 NOTE — TELEPHONE ENCOUNTER
----- Message from [place] ANALILIA Roland Showers sent at 12/10/2022 12:48 PM EST -----  Regarding: Prescriptions   My pharmacist said they never received the request to fill the medication that we discussed at my appointment.  I know that was only Friday but we typically have it the same day so Im just confirming that alexall have [facility] [address] in [place]

## 2022-12-13 RX ORDER — CYANOCOBALAMIN 1000 UG/ML
1000 INJECTION, SOLUTION INTRAMUSCULAR; SUBCUTANEOUS EVERY 2 WEEKS
Status: SHIPPED | OUTPATIENT
Start: 2022-12-13

## 2022-12-13 RX ORDER — SULFAMETHOXAZOLE AND TRIMETHOPRIM 800; 160 MG/1; MG/1
1 TABLET ORAL 2 TIMES DAILY
Qty: 20 TABLET | Refills: 0 | Status: SHIPPED | OUTPATIENT
Start: 2022-12-13 | End: 2022-12-23

## 2022-12-13 RX ORDER — TRIAMCINOLONE ACETONIDE 1 MG/G
OINTMENT TOPICAL 2 TIMES DAILY
Qty: 30 G | Refills: 0 | Status: SHIPPED | OUTPATIENT
Start: 2022-12-13

## 2022-12-13 RX ORDER — CEPHALEXIN 500 MG/1
500 CAPSULE ORAL 4 TIMES DAILY
Qty: 40 CAPSULE | Refills: 0 | Status: SHIPPED | OUTPATIENT
Start: 2022-12-13 | End: 2022-12-23

## 2022-12-20 ENCOUNTER — TELEPHONE (OUTPATIENT)
Dept: FAMILY MEDICINE CLINIC | Age: 27
End: 2022-12-20

## 2022-12-20 RX ORDER — PHENTERMINE HYDROCHLORIDE 15 MG/1
15 CAPSULE ORAL
Qty: 90 CAPSULE | Refills: 3 | Status: SHIPPED | OUTPATIENT
Start: 2022-12-20

## 2022-12-20 NOTE — TELEPHONE ENCOUNTER
Spoke to patient per prior auth and mediation coverage    Explained provider sent in phentermine /inexpensive per each month    Patient voiced understanding

## 2022-12-20 NOTE — TELEPHONE ENCOUNTER
Patient called and is wanted to know about her medication and her prior authorization on medication. Please call patient at [phone].

## 2023-01-09 ENCOUNTER — CLINICAL SUPPORT (OUTPATIENT)
Dept: FAMILY MEDICINE CLINIC | Age: 28
End: 2023-01-09
Payer: COMMERCIAL

## 2023-01-09 DIAGNOSIS — E53.8 B12 DEFICIENCY: Primary | ICD-10-CM

## 2023-01-09 PROCEDURE — 96372 THER/PROPH/DIAG INJ SC/IM: CPT | Performed by: NURSE PRACTITIONER

## 2023-01-09 NOTE — PROGRESS NOTES
Injection B12         B-12- Left arm  NDC 7821-1353-87  Cyanocbalamin  1000 mcg/ml  1ml  Lot:[id number]  Exp:May23  Pt tolerated injection well

## 2023-02-09 ENCOUNTER — CLINICAL SUPPORT (OUTPATIENT)
Dept: FAMILY MEDICINE CLINIC | Age: 28
End: 2023-02-09
Payer: COMMERCIAL

## 2023-02-09 DIAGNOSIS — E53.8 B12 DEFICIENCY: Primary | ICD-10-CM

## 2023-02-09 PROCEDURE — 96372 THER/PROPH/DIAG INJ SC/IM: CPT | Performed by: NURSE PRACTITIONER

## 2023-02-09 NOTE — PROGRESS NOTES
Injection B12         B-12- Left arm  NDC 7579-1895-84  Cyanocbalamin  1000 mcg/ml  1ml  Lot:[id number]  Exp:May23  Pt tolerated injection well

## 2023-02-15 ENCOUNTER — TELEPHONE (OUTPATIENT)
Dept: FAMILY MEDICINE CLINIC | Age: 28
End: 2023-02-15

## 2023-02-15 NOTE — TELEPHONE ENCOUNTER
C/o hives after taking Bactrim ds    Informed patient to d/c medication, she voiced understanding     Is there another medication recommended

## 2023-02-15 NOTE — TELEPHONE ENCOUNTER
Patient is taking Sulfamethxazole and it is making her break out in hives please call her she wants to know if this is normal or not [phone]

## 2023-03-17 ENCOUNTER — OFFICE VISIT (OUTPATIENT)
Dept: FAMILY MEDICINE CLINIC | Age: 28
End: 2023-03-17
Payer: COMMERCIAL

## 2023-03-17 VITALS
OXYGEN SATURATION: 97 % | HEIGHT: 69 IN | TEMPERATURE: 97.7 F | HEART RATE: 73 BPM | DIASTOLIC BLOOD PRESSURE: 78 MMHG | BODY MASS INDEX: 36.46 KG/M2 | WEIGHT: 246.2 LBS | SYSTOLIC BLOOD PRESSURE: 130 MMHG

## 2023-03-17 DIAGNOSIS — L02.211 ABSCESS OF SKIN OF ABDOMEN: ICD-10-CM

## 2023-03-17 DIAGNOSIS — E66.01 SEVERE OBESITY (BMI 35.0-39.9) WITH COMORBIDITY (HCC): ICD-10-CM

## 2023-03-17 DIAGNOSIS — E55.9 VITAMIN D DEFICIENCY: ICD-10-CM

## 2023-03-17 DIAGNOSIS — E53.8 B12 DEFICIENCY: Primary | ICD-10-CM

## 2023-03-17 DIAGNOSIS — R53.83 FATIGUE, UNSPECIFIED TYPE: ICD-10-CM

## 2023-03-17 DIAGNOSIS — E66.01 OBESITY, MORBID (HCC): ICD-10-CM

## 2023-03-17 PROCEDURE — 96372 THER/PROPH/DIAG INJ SC/IM: CPT | Performed by: NURSE PRACTITIONER

## 2023-03-17 PROCEDURE — 3078F DIAST BP <80 MM HG: CPT | Performed by: NURSE PRACTITIONER

## 2023-03-17 PROCEDURE — 3074F SYST BP LT 130 MM HG: CPT | Performed by: NURSE PRACTITIONER

## 2023-03-17 PROCEDURE — 99214 OFFICE O/P EST MOD 30 MIN: CPT | Performed by: NURSE PRACTITIONER

## 2023-03-17 RX ORDER — DOXYCYCLINE 100 MG/1
100 TABLET ORAL 2 TIMES DAILY
Qty: 20 TABLET | Refills: 1 | Status: SHIPPED | OUTPATIENT
Start: 2023-03-17 | End: 2023-03-27

## 2023-03-17 RX ORDER — CYANOCOBALAMIN 1000 UG/ML
1000 INJECTION, SOLUTION INTRAMUSCULAR; SUBCUTANEOUS ONCE
Status: COMPLETED | OUTPATIENT
Start: 2023-03-17 | End: 2023-03-17

## 2023-03-17 RX ADMIN — CYANOCOBALAMIN 1000 MCG: 1000 INJECTION, SOLUTION INTRAMUSCULAR; SUBCUTANEOUS at 11:00

## 2023-03-17 NOTE — PROGRESS NOTES
1. Have you been to the ER, urgent care clinic since your last visit? Hospitalized since your last visit? No    2. Have you seen or consulted any other health care providers outside of the [address] [place] [address] since your last visit? Include any pap smears or colon screening.  No    Chief Complaint   Patient presents with    Weight Management    Labs    Skin Problem     [facility] discharge       Visit Vitals  /78 (BP 1 Location: Left upper arm, BP Patient Position: Sitting, BP Cuff Size: Large adult)   Pulse 73   Temp 97.7 °F (36.5 °C) (Temporal)   Ht 5' 9\" (1.753 m)   Wt 246 lb 3.2 oz (111.7 kg)   LMP 03/01/2023   SpO2 97%   BMI 36.36 kg/m²

## 2023-03-17 NOTE — PROGRESS NOTES
Subjective:   Deidre Carmona is a 33 y.o. female  established here with complaints of   Chief Complaint   Patient presents with    Weight Management    Labs    Skin Problem     [facility]al discharge        Reports that d/c from the [facility] worse with foul order. Last abx bactrim had an allergic reaction. Abd pain has improved since stopping contrave. Still feeling drained despite b12 injection    Patient Active Problem List   Diagnosis Code    Dizziness R42    Obesity, morbid ([address] E66.01    B12 deficiency E53.8    Benign hypertension I10    GERD without esophagitis K21.9    Irregular menses N92.6    Prolonged periods N92.6    Menorrhagia with irregular cycle N92.1    Allergic rhinitis J30.9    Dysfunctional uterine bleeding N93.8    Dysuria R30.0    Eustachian tube dysfunction H69.80    Finding of above normal blood pressure R03.0    Gastroenteritis K52.9    Overweight E66.3    Chronic sinusitis, unspecified J32.9    Upper respiratory infection J06.9    Vaginitis N76.0    Vulvar pain R10.2    Weight loss R63.4     Patient Active Problem List    Diagnosis Date Noted    Allergic rhinitis 12/05/2022    Dysfunctional uterine bleeding 12/05/2022    Dysuria 12/05/2022    Eustachian tube dysfunction 12/05/2022    Finding of above normal blood pressure 12/05/2022    Gastroenteritis 12/05/2022    Overweight 12/05/2022    Chronic sinusitis, unspecified 12/05/2022    Upper respiratory infection 12/05/2022    Vaginitis 12/05/2022    Vulvar pain 12/05/2022    Weight loss 12/05/2022    Prolonged periods 11/07/2020    Menorrhagia with irregular cycle 11/07/2020    B12 deficiency 08/13/2020    Benign hypertension 08/13/2020    GERD without esophagitis 08/13/2020    Irregular menses 08/13/2020    Obesity, morbid ([address] 08/11/2020    Dizziness      Current Outpatient Medications   Medication Sig Dispense Refill    phentermine-topiramate 3.75-23 mg CM24 Take 1 Capsule by mouth daily. Max Daily Amount: 1 Capsule.  Indications: weight loss management for an obese person 90 Capsule 3    phentermine (ADIPEX_P) 15 mg capsule Take 1 Capsule by mouth every morning. Max Daily Amount: 15 mg. Indications: weight loss management for an obese person 90 Capsule 3    triamcinolone acetonide (KENALOG) 0.1 % ointment Apply  to affected area two (2) times a day. use thin layer 30 g 0    fluticasone propionate (FLONASE) 50 mcg/actuation nasal spray 2 Sprays by Both Nostrils route daily. Indications: inflammation of the nose due to an allergy 16 g 1    ibuprofen (MOTRIN) 800 mg tablet take 1 tablet by mouth every 8 hours if needed for pain 30 Tablet 5    pantoprazole (PROTONIX) 40 mg tablet Take 40 mg by mouth daily. ondansetron hcl (ZOFRAN) 8 mg tablet Take 1 Tablet by mouth every eight (8) hours as needed for Nausea or Nausea or Vomiting. 21 Tablet 5     Current Facility-Administered Medications   Medication Dose Route Frequency Provider Last Rate Last Admin    cyanocobalamin (VITAMIN B12) injection 1,000 mcg  1,000 mcg IntraMUSCular EVERY 2 WEEKS Klaudia Martinez NP        cyanocobalamin (VITAMIN B12) injection 1,000 mcg  1,000 mcg IntraMUSCular EVERY 2 WEEKS Klaudia Martinez NP   2,153 mcg at 05/10/22 0817    cyanocobalamin (VITAMIN B12) injection 1,000 mcg  1,000 mcg IntraMUSCular EVERY 2 WEEKS Klaudia Martinez NP   8,924 mcg at 10/18/22 3922     Allergies   Allergen Reactions    Bactrim [Sulfamethoxazole-Trimethoprim] Rash     Past Medical History:   Diagnosis Date    Dizziness     Obesity      History reviewed. No pertinent surgical history.   Family History   Problem Relation Age of Onset    Hypertension Mother     Diabetes Father         type 2     Thyroid Disease Sister     Hypertension Maternal Grandmother     Cancer Maternal Grandmother     Diabetes Paternal Grandmother         type 2    Other Sister         PCOS     Social History     Tobacco Use    Smoking status: Never    Smokeless tobacco: Never   Substance Use Topics    Alcohol use: Not Currently      Review of Systems    A comprehensive review of systems was negative except for that written in the HPI. Objective:     Visit Vitals  /78 (BP 1 Location: Left upper arm, BP Patient Position: Sitting, BP Cuff Size: Large adult)   Pulse 73   Temp 97.7 °F (36.5 °C) (Temporal)   Ht 5' 9\" (1.753 m)   Wt 246 lb 3.2 oz (111.7 kg)   LMP 03/01/2023   SpO2 97%   BMI 36.36 kg/m²     General:  Alert, cooperative, no distress, appears stated age. Head:  Normocephalic, without obvious abnormality, atraumatic. Eyes:  Conjunctivae/corneas clear. PERRL, EOMs intact. Fundi benign. Ears:  Normal TMs and external ear canals both ears. Nose: Nares normal. Septum midline. Mucosa normal. No drainage or sinus tenderness. Throat: Lips, mucosa, and tongue normal. Teeth and gums normal.   Neck: Supple, symmetrical, trachea midline, no adenopathy, thyroid: no enlargement/tenderness/nodules, no carotid bruit and no JVD. Back:   Symmetric, no curvature. ROM normal. No CVA tenderness. Lungs:   Clear to auscultation bilaterally. Chest wall:  No tenderness or deformity. Heart:  Regular rate and rhythm, S1, S2 normal, no murmur, click, rub or gallop. Assessment/Plan:       ICD-10-CM ICD-9-CM    1. B12 deficiency  E53.8 266.2 cyanocobalamin (VITAMIN B12) injection 1,000 mcg      2. Abscess of skin of abdomen  L02.211 682.2 doxycycline (ADOXA) 100 mg tablet      3. Severe obesity (BMI 35.0-39. 9) with comorbidity ([address]  E66.01 278.01       4. Obesity, morbid ([address]  E66.01 278.01 phentermine-topiramate 3.75-23 mg CM24      5. Fatigue, unspecified type  R53.83 780.79 VITAMIN D, 25 HYDROXY      CBC WITH AUTOMATED DIFF      METABOLIC PANEL, COMPREHENSIVE      IRON PROFILE      6.  Vitamin D deficiency  E55.9 268.9 VITAMIN D, 25 HYDROXY      CBC WITH AUTOMATED DIFF      METABOLIC PANEL, COMPREHENSIVE      IRON PROFILE        Follow-up and Dispositions    Return in about 3 months (around 6/17/2023) for Weight management new Qsymia, Fatigue , Lab review. Will start Qsymia for weight management  Educated on medication, uses, side effects, as well as signs and symptoms that may merit immediate medical attention. Patient is not to share medication and use only as indicated. Patient verbalized understanding and agreement. Issues discussed include maintaining a proper diet, maintaining medication compliance, medication side effects, future potential medication changes if needed. Treatment plan: Continue current medications, labwork from today discussed and reviewed, future labwork ordered, A1C, lipids check, patient up to date on maintence and screening exams. On this date 03/17/2023 I have spent 31 minutes reviewing previous notes, test results and face to face (virtual) with the patient discussing the diagnosis and importance of compliance with the treatment plan as well as documenting on the day of the visit. Aspects of this note may have been generated using voice recognition software. Despite editing, there may be some syntax errors.     Lavon Hernandez NP

## 2023-03-20 ENCOUNTER — TELEPHONE (OUTPATIENT)
Dept: FAMILY MEDICINE CLINIC | Age: 28
End: 2023-03-20

## 2023-03-20 NOTE — TELEPHONE ENCOUNTER
Patient calling per prior auth, stated that we not received and will work on asap     States out of meds and will be without for a few days

## 2023-03-23 NOTE — TELEPHONE ENCOUNTER
MEDICATION APPROVED Carmelita HOOKS CASE: [id number] SSTATUS APPROVED STARTING 03/23/2023-7/13/2023    SPOKE TO PHARMACY WILL CONTACT PATIENT

## 2023-03-23 NOTE — TELEPHONE ENCOUNTER
Patient called and stated she has been off her medication for 5 days and really needs her medication. Can clinical staff review to see if prior authorization has been received and please call patient back and advise.

## 2023-03-24 ENCOUNTER — PATIENT MESSAGE (OUTPATIENT)
Dept: FAMILY MEDICINE CLINIC | Age: 28
End: 2023-03-24

## 2023-04-17 ENCOUNTER — PATIENT MESSAGE (OUTPATIENT)
Dept: FAMILY MEDICINE CLINIC | Age: 28
End: 2023-04-17

## 2023-04-17 DIAGNOSIS — E53.8 VITAMIN B 12 DEFICIENCY: Primary | ICD-10-CM

## 2023-04-17 NOTE — TELEPHONE ENCOUNTER
From: Francisco Willis  To: Barby Hurley NP  Sent: 4/17/2023 8:11 AM EDT  Subject: B-12 shot    Can you please prescribe it to where I can give the shot to myself? I forgot my appointment this morning.

## 2023-04-17 NOTE — TELEPHONE ENCOUNTER
Requested Prescriptions     Pending Prescriptions Disp Refills    cyanocobalamin, vitamin B-12, 1,000 mcg/mL kit       Sig: by Injection route. Indications: inadequate vitamin B12          Patient is requesting to have b12 injections sent to pharmacy so she is able to give to herself.

## 2023-04-19 ENCOUNTER — PATIENT MESSAGE (OUTPATIENT)
Dept: FAMILY MEDICINE CLINIC | Age: 28
End: 2023-04-19

## 2023-04-20 NOTE — TELEPHONE ENCOUNTER
From: Daisy Wiley  To:  Kiko Pleitez NP  Sent: 4/19/2023 11:16 AM EDT  Subject: B-12 needles    Good morning,     I went and picked up the b-12 prescription today and got the actual medicine but they said I need a prescription for the actual needles

## 2023-04-20 NOTE — TELEPHONE ENCOUNTER
Requested Prescriptions     Pending Prescriptions Disp Refills    Syringe-Needle, Safety,Disp Un 3 mL 25 gauge x 1\" syrg       Sig: by Does Not Apply route. Oral Medication Containers misc       Sig: by Does Not Apply route.

## 2023-05-17 ENCOUNTER — TELEPHONE (OUTPATIENT)
Facility: CLINIC | Age: 28
End: 2023-05-17

## 2023-05-17 RX ORDER — CYANOCOBALAMIN 1000 UG/ML
1 INJECTION, SOLUTION INTRAMUSCULAR; SUBCUTANEOUS
COMMUNITY
Start: 2023-04-17 | End: 2023-07-14

## 2023-05-17 RX ORDER — CYANOCOBALAMIN 1000 UG/ML
1000 INJECTION, SOLUTION INTRAMUSCULAR; SUBCUTANEOUS
COMMUNITY
Start: 2020-08-11

## 2023-05-17 RX ORDER — PHENTERMINE HYDROCHLORIDE 15 MG/1
15 CAPSULE ORAL
COMMUNITY
Start: 2022-12-20 | End: 2023-05-17 | Stop reason: SDUPTHER

## 2023-05-17 RX ORDER — IBUPROFEN 800 MG/1
1 TABLET ORAL EVERY 8 HOURS PRN
COMMUNITY
Start: 2022-06-14

## 2023-05-17 RX ORDER — FLUTICASONE PROPIONATE 50 MCG
2 SPRAY, SUSPENSION (ML) NASAL DAILY
COMMUNITY
Start: 2022-09-27

## 2023-05-17 RX ORDER — ONDANSETRON HYDROCHLORIDE 8 MG/1
8 TABLET, FILM COATED ORAL EVERY 8 HOURS PRN
COMMUNITY
Start: 2022-04-29 | End: 2023-06-01

## 2023-05-17 RX ORDER — PANTOPRAZOLE SODIUM 40 MG/1
40 TABLET, DELAYED RELEASE ORAL DAILY
COMMUNITY

## 2023-05-17 RX ORDER — PHENTERMINE HYDROCHLORIDE 15 MG/1
15 CAPSULE ORAL EVERY MORNING
Qty: 90 CAPSULE | Refills: 1 | Status: SHIPPED | OUTPATIENT
Start: 2023-05-17 | End: 2023-11-13

## 2023-05-17 NOTE — TELEPHONE ENCOUNTER
----- Message from Jane Bradford MA sent at 5/17/2023  8:54 AM EDT -----  Subject: Message to Provider    QUESTIONS  Information for Provider? Changing employment and will not have insurance   until end of July. Will she have enough meds until then without an appt? Please call patient.   ---------------------------------------------------------------------------  --------------  [facility]  [phone]; OK to leave message on voicemail  ---------------------------------------------------------------------------  --------------  SCRIPT ANSWERS  Relationship to Patient?  Self

## 2023-05-17 NOTE — TELEPHONE ENCOUNTER
Patient questions if she can have 1 more rx +1 refill before July b/c she is switching jobs and will not have any insurance    Medication Phentermine

## 2023-05-23 ENCOUNTER — TELEPHONE (OUTPATIENT)
Facility: CLINIC | Age: 28
End: 2023-05-23

## 2023-05-23 ENCOUNTER — NURSE TRIAGE (OUTPATIENT)
Dept: OTHER | Facility: CLINIC | Age: 28
End: 2023-05-23

## 2023-05-23 NOTE — TELEPHONE ENCOUNTER
[facility]    Received call from [address]Ed[address] Doss [address] at [facility] with [employer]. Subjective: Caller states \"Toe numbness\"     Current Symptoms: Numbness in pinkie toes in both feet, comes and goes, stays for about 10 minutes, looks like the others as far as color    Onset: 1 months ago    Pain Severity: 0/10    Temperature: NA     What has been tried: Nothing yet    History related to the current reason for call: Problem list reviewed and no current problems related to this reason for call    LMP:  Just finished  Pregnant: No    Recommended disposition: Bryanna Joseph 6896 advice provided, patient verbalizes understanding; denies any other questions or concerns; instructed to call back for any new or worsening symptoms. Patient/Caller agrees with recommended disposition; writer provided warm transfer to [employer] at [facility] for appointment scheduling     Attention Provider: Thank you for allowing me to participate in the care of your patient. The patient was connected to triage in response to information provided to the ECC/PSC. Please do not respond through this encounter as the response is not directed to a shared pool.     Reason for Disposition   Brief (now gone) tingling in foot (e.g., pins and needles) after prolonged sitting with legs crossed    Protocols used: Neurologic Deficit-ADULT-[place]

## 2023-06-01 RX ORDER — ONDANSETRON HYDROCHLORIDE 8 MG/1
TABLET, FILM COATED ORAL
Qty: 21 TABLET | Refills: 1 | Status: SHIPPED | OUTPATIENT
Start: 2023-06-01

## 2023-06-01 NOTE — TELEPHONE ENCOUNTER
Requested Prescriptions     Pending Prescriptions Disp Refills    ondansetron (ZOFRAN) 8 MG tablet [Pharmacy Med Name: ONDANSETRON HCL 8 MG TABLET] 21 tablet      Sig: take 1 tablet by mouth every 8 hours if needed for NAUSEA OR VOMITING     Last OV:03/17/2023  Next OV:none  Last Refill:04/29/2022  Last (labs):10/04/2022    -*Insert any notes here*

## 2023-07-14 ENCOUNTER — OFFICE VISIT (OUTPATIENT)
Facility: CLINIC | Age: 28
End: 2023-07-14
Payer: COMMERCIAL

## 2023-07-14 VITALS
BODY MASS INDEX: 36.29 KG/M2 | RESPIRATION RATE: 18 BRPM | DIASTOLIC BLOOD PRESSURE: 78 MMHG | WEIGHT: 245 LBS | SYSTOLIC BLOOD PRESSURE: 117 MMHG | HEART RATE: 62 BPM | TEMPERATURE: 97.5 F | HEIGHT: 69 IN | OXYGEN SATURATION: 99 %

## 2023-07-14 DIAGNOSIS — H61.21 RIGHT EAR IMPACTED CERUMEN: ICD-10-CM

## 2023-07-14 DIAGNOSIS — D49.2 GROWTH OF EAR CANAL: Primary | ICD-10-CM

## 2023-07-14 DIAGNOSIS — E53.8 DEFICIENCY OF OTHER SPECIFIED B GROUP VITAMINS: ICD-10-CM

## 2023-07-14 PROCEDURE — 96372 THER/PROPH/DIAG INJ SC/IM: CPT | Performed by: FAMILY MEDICINE

## 2023-07-14 PROCEDURE — 3078F DIAST BP <80 MM HG: CPT | Performed by: FAMILY MEDICINE

## 2023-07-14 PROCEDURE — 99214 OFFICE O/P EST MOD 30 MIN: CPT | Performed by: FAMILY MEDICINE

## 2023-07-14 PROCEDURE — 3074F SYST BP LT 130 MM HG: CPT | Performed by: FAMILY MEDICINE

## 2023-07-14 RX ORDER — CYANOCOBALAMIN 1000 UG/ML
1000 INJECTION, SOLUTION INTRAMUSCULAR; SUBCUTANEOUS ONCE
Status: COMPLETED | OUTPATIENT
Start: 2023-07-14 | End: 2023-07-14

## 2023-07-14 RX ADMIN — CYANOCOBALAMIN 1000 MCG: 1000 INJECTION, SOLUTION INTRAMUSCULAR; SUBCUTANEOUS at 09:15

## 2023-07-14 SDOH — ECONOMIC STABILITY: HOUSING INSECURITY: IN THE LAST 12 MONTHS, HOW MANY PLACES HAVE YOU LIVED?: 1

## 2023-07-14 SDOH — ECONOMIC STABILITY: FOOD INSECURITY: WITHIN THE PAST 12 MONTHS, YOU WORRIED THAT YOUR FOOD WOULD RUN OUT BEFORE YOU GOT MONEY TO BUY MORE.: NEVER TRUE

## 2023-07-14 SDOH — HEALTH STABILITY: PHYSICAL HEALTH: ON AVERAGE, HOW MANY MINUTES DO YOU ENGAGE IN EXERCISE AT THIS LEVEL?: 0 MIN

## 2023-07-14 SDOH — ECONOMIC STABILITY: TRANSPORTATION INSECURITY
IN THE PAST 12 MONTHS, HAS LACK OF TRANSPORTATION KEPT YOU FROM MEETINGS, WORK, OR FROM GETTING THINGS NEEDED FOR DAILY LIVING?: NO

## 2023-07-14 SDOH — ECONOMIC STABILITY: INCOME INSECURITY: IN THE LAST 12 MONTHS, WAS THERE A TIME WHEN YOU WERE NOT ABLE TO PAY THE MORTGAGE OR RENT ON TIME?: NO

## 2023-07-14 SDOH — HEALTH STABILITY: PHYSICAL HEALTH: ON AVERAGE, HOW MANY DAYS PER WEEK DO YOU ENGAGE IN MODERATE TO STRENUOUS EXERCISE (LIKE A BRISK WALK)?: 0 DAYS

## 2023-07-14 SDOH — ECONOMIC STABILITY: FOOD INSECURITY: WITHIN THE PAST 12 MONTHS, THE FOOD YOU BOUGHT JUST DIDN'T LAST AND YOU DIDN'T HAVE MONEY TO GET MORE.: NEVER TRUE

## 2023-07-14 SDOH — ECONOMIC STABILITY: INCOME INSECURITY: HOW HARD IS IT FOR YOU TO PAY FOR THE VERY BASICS LIKE FOOD, HOUSING, MEDICAL CARE, AND HEATING?: NOT HARD AT ALL

## 2023-07-14 SDOH — ECONOMIC STABILITY: HOUSING INSECURITY
IN THE LAST 12 MONTHS, WAS THERE A TIME WHEN YOU DID NOT HAVE A STEADY PLACE TO SLEEP OR SLEPT IN A SHELTER (INCLUDING NOW)?: NO

## 2023-07-14 SDOH — ECONOMIC STABILITY: TRANSPORTATION INSECURITY
IN THE PAST 12 MONTHS, HAS THE LACK OF TRANSPORTATION KEPT YOU FROM MEDICAL APPOINTMENTS OR FROM GETTING MEDICATIONS?: NO

## 2023-07-14 ASSESSMENT — LIFESTYLE VARIABLES
HOW MANY STANDARD DRINKS CONTAINING ALCOHOL DO YOU HAVE ON A TYPICAL DAY: PATIENT DOES NOT DRINK
HOW OFTEN DO YOU HAVE A DRINK CONTAINING ALCOHOL: NEVER

## 2023-07-14 ASSESSMENT — SOCIAL DETERMINANTS OF HEALTH (SDOH)
WITHIN THE LAST YEAR, HAVE YOU BEEN HUMILIATED OR EMOTIONALLY ABUSED IN OTHER WAYS BY YOUR PARTNER OR EX-PARTNER?: NO
HOW OFTEN DO YOU GET TOGETHER WITH FRIENDS OR RELATIVES?: MORE THAN THREE TIMES A WEEK
WITHIN THE LAST YEAR, HAVE YOU BEEN KICKED, HIT, SLAPPED, OR OTHERWISE PHYSICALLY HURT BY YOUR PARTNER OR EX-PARTNER?: NO
HOW OFTEN DO YOU ATTENT MEETINGS OF THE CLUB OR ORGANIZATION YOU BELONG TO?: NEVER
WITHIN THE LAST YEAR, HAVE YOU BEEN AFRAID OF YOUR PARTNER OR EX-PARTNER?: NO
WITHIN THE LAST YEAR, HAVE TO BEEN RAPED OR FORCED TO HAVE ANY KIND OF SEXUAL ACTIVITY BY YOUR PARTNER OR EX-PARTNER?: NO
HOW OFTEN DO YOU ATTEND CHURCH OR RELIGIOUS SERVICES?: NEVER
IN A TYPICAL WEEK, HOW MANY TIMES DO YOU TALK ON THE PHONE WITH FAMILY, FRIENDS, OR NEIGHBORS?: MORE THAN THREE TIMES A WEEK
DO YOU BELONG TO ANY CLUBS OR ORGANIZATIONS SUCH AS CHURCH GROUPS UNIONS, FRATERNAL OR ATHLETIC GROUPS, OR SCHOOL GROUPS?: NO

## 2023-07-14 NOTE — PROGRESS NOTES
Subjective  Chief Complaint   Patient presents with    Shannon Rings in ear canal    Injections     B12     This patient is new to this provider. HPI:  Socrates Ritter is a 33 y.o. female with medical problems as listed below who presents for bump in ear. Right ear growth and discomfort: Has noticed \"annoying\" sensation in right ear for the last week. Feels like it is stopped up or there is water in ear. No real hearing impairment. Duration 1 week. She also reports a \"bump\" in the right ear that she just noticed for the last 1-2 weeks. She was told as a child by her PCP that she had a bump in her right ear that might have to be removed when she was older. She has been applying mupirocin and topical steroid as instructed by an Urgent Care telehealth visit this past Tuesday.        Patient Active Problem List   Diagnosis    Menorrhagia with irregular cycle    Prolonged periods    Benign hypertension    Irregular menses    Dizziness    GERD without esophagitis    Obesity, morbid (HCC)    B12 deficiency    Allergic rhinitis    Dysfunctional uterine bleeding    Dysuria    Eustachian tube dysfunction    Finding of above normal blood pressure    Gastroenteritis    Overweight    Chronic sinusitis, unspecified    Upper respiratory infection    Vaginitis    Vulvar pain    Weight loss     Family History   Problem Relation Age of Onset    Hypertension Mother     Cancer Maternal Grandmother     Diabetes Paternal Grandmother         type 2    Hypertension Maternal Grandmother     Thyroid Disease Sister     Diabetes Father         type 2     Other Sister         PCOS      Social History     Tobacco Use    Smoking status: Never    Smokeless tobacco: Never   Substance Use Topics    Alcohol use: Not Currently    Drug use: Not Currently     Current Outpatient Medications on File Prior to Visit   Medication Sig Dispense Refill    ondansetron (ZOFRAN) 8 MG tablet take 1 tablet by mouth every 8 hours if needed for NAUSEA OR

## 2024-01-13 ENCOUNTER — HOSPITAL ENCOUNTER (EMERGENCY)
Facility: HOSPITAL | Age: 29
Discharge: HOME | End: 2024-01-13
Attending: EMERGENCY MEDICINE
Payer: COMMERCIAL

## 2024-01-13 VITALS
WEIGHT: 250 LBS | DIASTOLIC BLOOD PRESSURE: 75 MMHG | HEIGHT: 69 IN | TEMPERATURE: 98.4 F | SYSTOLIC BLOOD PRESSURE: 117 MMHG | HEART RATE: 82 BPM | RESPIRATION RATE: 16 BRPM | OXYGEN SATURATION: 97 % | BODY MASS INDEX: 37.03 KG/M2

## 2024-01-13 DIAGNOSIS — K59.00 CONSTIPATION, UNSPECIFIED CONSTIPATION TYPE: Primary | ICD-10-CM

## 2024-01-13 LAB
ALBUMIN SERPL-MCNC: 3 G/DL (ref 3.5–5)
ALBUMIN/GLOB SERPL: 0.8 (ref 1.1–2.2)
ALP SERPL-CCNC: 82 U/L (ref 45–117)
ALT SERPL-CCNC: 16 U/L (ref 12–78)
ANION GAP SERPL CALC-SCNC: 6 MMOL/L (ref 5–15)
APPEARANCE UR: CLEAR
AST SERPL W P-5'-P-CCNC: 11 U/L (ref 15–37)
BACTERIA URNS QL MICRO: NEGATIVE /HPF
BASOPHILS # BLD: 0 K/UL (ref 0–0.1)
BASOPHILS NFR BLD: 0 % (ref 0–1)
BILIRUB SERPL-MCNC: 0.6 MG/DL (ref 0.2–1)
BILIRUB UR QL: NEGATIVE
BUN SERPL-MCNC: 3 MG/DL (ref 6–20)
BUN/CREAT SERPL: 6 (ref 12–20)
CA-I BLD-MCNC: 9.5 MG/DL (ref 8.5–10.1)
CHLORIDE SERPL-SCNC: 105 MMOL/L (ref 97–108)
CO2 SERPL-SCNC: 24 MMOL/L (ref 21–32)
COLOR UR: ABNORMAL
CREAT SERPL-MCNC: 0.5 MG/DL (ref 0.55–1.02)
DIFFERENTIAL METHOD BLD: ABNORMAL
EOSINOPHIL # BLD: 0 K/UL (ref 0–0.4)
EOSINOPHIL NFR BLD: 0 % (ref 0–7)
EPITH CASTS URNS QL MICRO: ABNORMAL /LPF
ERYTHROCYTE [DISTWIDTH] IN BLOOD BY AUTOMATED COUNT: 13.7 % (ref 11.5–14.5)
GLOBULIN SER CALC-MCNC: 3.8 G/DL (ref 2–4)
GLUCOSE SERPL-MCNC: 91 MG/DL (ref 65–100)
GLUCOSE UR STRIP.AUTO-MCNC: NEGATIVE MG/DL
HCT VFR BLD AUTO: 39.5 % (ref 35–47)
HGB BLD-MCNC: 13.1 G/DL (ref 11.5–16)
HGB UR QL STRIP: NEGATIVE
IMM GRANULOCYTES # BLD AUTO: 0 K/UL (ref 0–0.04)
IMM GRANULOCYTES NFR BLD AUTO: 0 % (ref 0–0.5)
KETONES UR QL STRIP.AUTO: 5 MG/DL
LEUKOCYTE ESTERASE UR QL STRIP.AUTO: NEGATIVE
LYMPHOCYTES # BLD: 1.3 K/UL (ref 0.8–3.5)
LYMPHOCYTES NFR BLD: 15 % (ref 12–49)
MCH RBC QN AUTO: 28.7 PG (ref 26–34)
MCHC RBC AUTO-ENTMCNC: 33.2 G/DL (ref 30–36.5)
MCV RBC AUTO: 86.6 FL (ref 80–99)
MONOCYTES # BLD: 0.5 K/UL (ref 0–1)
MONOCYTES NFR BLD: 5 % (ref 5–13)
MUCOUS THREADS URNS QL MICRO: ABNORMAL /LPF
NEUTS SEG # BLD: 7.1 K/UL (ref 1.8–8)
NEUTS SEG NFR BLD: 80 % (ref 32–75)
NITRITE UR QL STRIP.AUTO: NEGATIVE
NRBC # BLD: 0 K/UL (ref 0–0.01)
NRBC BLD-RTO: 0 PER 100 WBC
PH UR STRIP: 7 (ref 5–8)
PLATELET # BLD AUTO: 202 K/UL (ref 150–400)
PMV BLD AUTO: 10.7 FL (ref 8.9–12.9)
POTASSIUM SERPL-SCNC: 3.6 MMOL/L (ref 3.5–5.1)
PROT SERPL-MCNC: 6.8 G/DL (ref 6.4–8.2)
PROT UR STRIP-MCNC: NEGATIVE MG/DL
RBC # BLD AUTO: 4.56 M/UL (ref 3.8–5.2)
RBC #/AREA URNS HPF: ABNORMAL /HPF (ref 0–5)
SODIUM SERPL-SCNC: 135 MMOL/L (ref 136–145)
SP GR UR REFRACTOMETRY: 1.01 (ref 1–1.03)
URINE CULTURE IF INDICATED: ABNORMAL
UROBILINOGEN UR QL STRIP.AUTO: 2 EU/DL (ref 0.1–1)
WBC # BLD AUTO: 8.9 K/UL (ref 3.6–11)
WBC URNS QL MICRO: ABNORMAL /HPF (ref 0–4)

## 2024-01-13 PROCEDURE — 85025 COMPLETE CBC W/AUTO DIFF WBC: CPT

## 2024-01-13 PROCEDURE — 80053 COMPREHEN METABOLIC PANEL: CPT

## 2024-01-13 PROCEDURE — 36415 COLL VENOUS BLD VENIPUNCTURE: CPT

## 2024-01-13 PROCEDURE — 99283 EMERGENCY DEPT VISIT LOW MDM: CPT

## 2024-01-13 PROCEDURE — 81001 URINALYSIS AUTO W/SCOPE: CPT

## 2024-01-13 PROCEDURE — 6370000000 HC RX 637 (ALT 250 FOR IP): Performed by: EMERGENCY MEDICINE

## 2024-01-13 RX ORDER — DOCUSATE SODIUM 100 MG/1
100 CAPSULE, LIQUID FILLED ORAL 2 TIMES DAILY PRN
Qty: 60 CAPSULE | Refills: 0 | Status: SHIPPED | OUTPATIENT
Start: 2024-01-13 | End: 2024-02-12

## 2024-01-13 RX ORDER — ACETAMINOPHEN 500 MG
1000 TABLET ORAL
Status: COMPLETED | OUTPATIENT
Start: 2024-01-13 | End: 2024-01-13

## 2024-01-13 RX ORDER — ACETAMINOPHEN 500 MG
500 TABLET ORAL EVERY 6 HOURS PRN
Qty: 20 TABLET | Refills: 0 | Status: SHIPPED | OUTPATIENT
Start: 2024-01-13 | End: 2024-01-20

## 2024-01-13 RX ADMIN — ACETAMINOPHEN 1000 MG: 500 TABLET ORAL at 06:28

## 2024-01-13 ASSESSMENT — PAIN DESCRIPTION - LOCATION: LOCATION: ABDOMEN

## 2024-01-13 ASSESSMENT — PAIN SCALES - GENERAL
PAINLEVEL_OUTOF10: 7
PAINLEVEL_OUTOF10: 4
PAINLEVEL_OUTOF10: 5

## 2024-01-13 ASSESSMENT — PAIN - FUNCTIONAL ASSESSMENT: PAIN_FUNCTIONAL_ASSESSMENT: 0-10

## 2024-01-13 ASSESSMENT — PAIN DESCRIPTION - ORIENTATION: ORIENTATION: LOWER;RIGHT

## 2024-01-13 NOTE — DISCHARGE INSTRUCTIONS
Thank you!  Thank you for allowing me to care for you in the emergency department. It is my goal to provide you with excellent care. If you have not received excellent quality care, please ask to speak to the nurse manager. Please fill out the survey that will come to you by mail or email since we listen to your feedback!     Below you will find a list of your tests from today's visit.  Should you have any questions, please do not hesitate to call the emergency department.    Labs  Recent Results (from the past 12 hour(s))   CBC with Auto Differential    Collection Time: 01/13/24  6:30 AM   Result Value Ref Range    WBC 8.9 3.6 - 11.0 K/uL    RBC 4.56 3.80 - 5.20 M/uL    Hemoglobin 13.1 11.5 - 16.0 g/dL    Hematocrit 39.5 35.0 - 47.0 %    MCV 86.6 80.0 - 99.0 FL    MCH 28.7 26.0 - 34.0 PG    MCHC 33.2 30.0 - 36.5 g/dL    RDW 13.7 11.5 - 14.5 %    Platelets 202 150 - 400 K/uL    MPV 10.7 8.9 - 12.9 FL    Nucleated RBCs 0.0 0.0  WBC    nRBC 0.00 0.00 - 0.01 K/uL    Neutrophils % 80 (H) 32 - 75 %    Lymphocytes % 15 12 - 49 %    Monocytes % 5 5 - 13 %    Eosinophils % 0 0 - 7 %    Basophils % 0 0 - 1 %    Immature Granulocytes 0 0 - 0.5 %    Neutrophils Absolute 7.1 1.8 - 8.0 K/UL    Lymphocytes Absolute 1.3 0.8 - 3.5 K/UL    Monocytes Absolute 0.5 0.0 - 1.0 K/UL    Eosinophils Absolute 0.0 0.0 - 0.4 K/UL    Basophils Absolute 0.0 0.0 - 0.1 K/UL    Absolute Immature Granulocyte 0.0 0.00 - 0.04 K/UL    Differential Type AUTOMATED     Comprehensive Metabolic Panel    Collection Time: 01/13/24  6:30 AM   Result Value Ref Range    Sodium 135 (L) 136 - 145 mmol/L    Potassium 3.6 3.5 - 5.1 mmol/L    Chloride 105 97 - 108 mmol/L    CO2 24 21 - 32 mmol/L    Anion Gap 6 5 - 15 mmol/L    Glucose 91 65 - 100 mg/dL    BUN 3 (L) 6 - 20 mg/dL    Creatinine 0.50 (L) 0.55 - 1.02 mg/dL    Bun/Cre Ratio 6 (L) 12 - 20      Est, Glom Filt Rate >60 >60 ml/min/1.73m2    Calcium 9.5 8.5 - 10.1 mg/dL    Total Bilirubin 0.6 0.2 -  available 24 hours a day.     If a prescription has been provided, please have it filled as soon as possible to prevent a delay in treatment. If you have any questions or reservations about taking the medication due to side effects or interactions with other medications, please call your primary care provider or contact the ER.

## 2024-01-13 NOTE — ED PROVIDER NOTES
sodium (COLACE) 100 MG capsule Take 1 capsule by mouth 2 times daily as needed for Constipation 60 capsule 0    ondansetron (ZOFRAN) 8 MG tablet take 1 tablet by mouth every 8 hours if needed for NAUSEA OR VOMITING 21 tablet 1    Oral Medication Containers MISC 1 each by Other route daily      cyanocobalamin 1000 MCG/ML injection Inject 1 mL into the muscle      fluticasone (FLONASE) 50 MCG/ACT nasal spray 2 sprays by Nasal route daily      ibuprofen (ADVIL;MOTRIN) 800 MG tablet Take 1 tablet by mouth every 8 hours as needed      pantoprazole (PROTONIX) 40 MG tablet Take 1 tablet by mouth daily      Phentermine-Topiramate 3.75-23 MG CP24 Take 1 capsule by mouth daily. Max Daily Amount: 1 capsule      triamcinolone (KENALOG) 0.1 % ointment Apply topically 2 times daily       Social Determinants of Health:   Social Determinants of Health     Tobacco Use: Low Risk  (1/13/2024)    Patient History     Smoking Tobacco Use: Never     Smokeless Tobacco Use: Never     Passive Exposure: Not on file   Alcohol Use: Not At Risk (1/13/2024)    AUDIT-C     Frequency of Alcohol Consumption: Never     Average Number of Drinks: Patient does not drink     Frequency of Binge Drinking: Never   Financial Resource Strain: Low Risk  (7/14/2023)    Overall Financial Resource Strain (CARDIA)     Difficulty of Paying Living Expenses: Not hard at all   Food Insecurity: Not on file (7/14/2023)   Transportation Needs: No Transportation Needs (7/14/2023)    PRAPARE - Transportation     Lack of Transportation (Medical): No     Lack of Transportation (Non-Medical): No   Physical Activity: Inactive (7/14/2023)    Exercise Vital Sign     Days of Exercise per Week: 0 days     Minutes of Exercise per Session: 0 min   Stress: No Stress Concern Present (7/14/2023)    [employer] - Occupational Stress Questionnaire     Feeling of Stress : Not at all   Social Connections: Moderately Isolated (7/14/2023)    Social Connection and  such as CT, ultrasound and MRI are read by the radiologist.   Plain radiographic images are visualized and preliminarily interpreted by me, the ED Provider, with the following findings:   Not applicable.    Interpretation per the Radiologist below, if available at the time of this note:  No orders to display      EMERGENCY DEPARTMENT COURSE and DIFFERENTIAL DIAGNOSIS/MDM   CC/HPI Summary: Abdominal pain  Ddx: Constipation, UTI appendicitis, diverticulitis  ED Course and Reassessment:     Sepsis Reassessment: Not applicable    Disposition Considerations: Not applicable    Additional ED Course  ED Course as of 01/14/24 1526   Sat Jan 13, 2024   0752 The workup shows no leukocytosis.  This is reassuring that this is likely not appendicitis.  I believe she has constipation.  I recommended adding Colace on a daily and prescribed this.  I recommended using senna as needed and to take MiraLAX as needed.  I recommended fruits and vegetables.  Given return precautions. [SW]      ED Course User Index  [SW] Jakob Barry, DO       Clinical Management Tools:  Not Applicable    Records Reviewed (source and summary of external notes): Prior medical records and Nursing notes    Vitals:    Vitals:    01/13/24 0546 01/13/24 0800   BP: 122/81 117/75   Pulse: 91 82   Resp: 18 16   Temp: 98.2 °F (36.8 °C) 98.4 °F (36.9 °C)   SpO2: 98% 97%   Weight: 113.4 kg (250 lb)    Height: 1.753 m (5' 9\")         Patient was given the following medications:  Medications   acetaminophen (TYLENOL) tablet 1,000 mg (1,000 mg Oral Given 1/13/24 0628)       CONSULTS: (Who and What was discussed)  No consults.     Social Determinants affecting Dx or Tx: None    PROCEDURES   Procedures   No procedures.     Smoking Cessation: None.    CRITICAL CARE TIME     Patient care does not meet critical care criteria.     DISPOSITION/PLAN   DISPOSITION Decision To Discharge 01/13/2024 07:51:42 AM      Discharged.     PATIENT REFERRED TO:  [facility]